# Patient Record
Sex: MALE | Race: WHITE | Employment: FULL TIME | URBAN - METROPOLITAN AREA
[De-identification: names, ages, dates, MRNs, and addresses within clinical notes are randomized per-mention and may not be internally consistent; named-entity substitution may affect disease eponyms.]

---

## 2018-08-06 ENCOUNTER — TRANSFERRED RECORDS (OUTPATIENT)
Dept: HEALTH INFORMATION MANAGEMENT | Facility: CLINIC | Age: 53
End: 2018-08-06

## 2018-08-09 ENCOUNTER — TRANSFERRED RECORDS (OUTPATIENT)
Dept: HEALTH INFORMATION MANAGEMENT | Facility: CLINIC | Age: 53
End: 2018-08-09

## 2018-08-12 ENCOUNTER — TRANSFERRED RECORDS (OUTPATIENT)
Dept: HEALTH INFORMATION MANAGEMENT | Facility: CLINIC | Age: 53
End: 2018-08-12

## 2018-08-24 ENCOUNTER — TRANSFERRED RECORDS (OUTPATIENT)
Dept: HEALTH INFORMATION MANAGEMENT | Facility: CLINIC | Age: 53
End: 2018-08-24

## 2018-12-17 ENCOUNTER — MEDICAL CORRESPONDENCE (OUTPATIENT)
Dept: HEALTH INFORMATION MANAGEMENT | Facility: CLINIC | Age: 53
End: 2018-12-17

## 2018-12-20 ENCOUNTER — TELEPHONE (OUTPATIENT)
Dept: GASTROENTEROLOGY | Facility: CLINIC | Age: 53
End: 2018-12-20

## 2018-12-20 NOTE — TELEPHONE ENCOUNTER
[facility] Intake form:    Referring/Requesting provider and Health care System: John Figueroa [facility]    Clinic contact - Name, Phone and Fax number: Judith [phone] option 0 , Fax [phone]    Requested provider (if specified): Dr. Peacock    Has patient been evaluated in clinic or had a procedure [facility] provider in the last 5 years: X No      Indication/Diagnosis for consultation: Acute Pancreatitis    Is diagnosis on list of approved diagnosis: X Yes     Has patient been evaluated by another Gastroenterologist? X Unknown     Imaging completed:     CT scan     X Yes    MRI         X No      Procedures:     Upper Endoscopy/EGD   X No     Endoscopic Ultrasound/EUS X No    ERCP      X No    Colonoscopy    X No      Are images able/being pushed to our system? X No - CT being jama    Is patient aware of request for clinc consultation and ok to be contacted to schedule? X Yes      Inform referring clinic of the following and provided fax number to: X [facility]     READ TO REFERRING CLINIC:  Due to high demands for our services our clinic requires all records including imaging studies be mailed and faxed to our facility prior to scheduling. Records should be faxed within 24-72 hours of referral if possible and images should be pushed to our PACS system or received by mail within 72 hours of referral. Our office will NOT call to follow up or request records.  Our clinic will not be able to process, schedule or contact patient without records and Images for MD review process. Once records have been reviewed and recommendation/orders have been made the patient will be contacted to schedule. If records have not been received within 2 weeks of initial referral call, referring office will be notified by letter and referral will be closed. If an appointment is unable to be offered at this time we will inform the referring office and patient via letter.

## 2018-12-24 ENCOUNTER — PATIENT OUTREACH (OUTPATIENT)
Dept: GASTROENTEROLOGY | Facility: CLINIC | Age: 53
End: 2018-12-24

## 2018-12-24 DIAGNOSIS — K85.90 PANCREATITIS: Primary | ICD-10-CM

## 2018-12-24 NOTE — PROGRESS NOTES
Advanced Endoscopy     Referring provider: John Figueroa [facility]    Clinic contact - Name, Phone and Fax number: Judith [phone] option 0 , Fax [phone]    Referred to: [facility]     Provider Requested: Ayush     Referral Received: 12/20/2018     Records received: 12/20/2018- 37 pages scanned into Epic.      Images received: Per intake CT being mailed in.     Evaluation for: Acute pancreatitis     Clinical History (per RN review):   Hospitalized at [facility] for acute pancreatitis 8/9/2018. Pt had a couple of beers and fatty food and this was labeled the cause, patient is skeptical of this as he states he does not drink excessively and not frequently.     On simvastatin for hyperlipidemia.    8/9/2018 CT   Impression;  Pancreas is somewhat poorly defined but is felt to be of normal size and without dilation of the pancreatic duct and no pancreatic calcification evident.  Apparent large phlegmon reaction surrounds the pancreas and consistent with acute pancreatitis.  Correlate with appropriate laboratory findings.  There is a small to moderate amount of free fluid in the dependent portion of the pelvis.  The appendix is normal with no abnormality involving the bowel.  The gallbladder is slightly enlarged with no biliary ductal diet dilatation       MD review date: 12/24/2018 routed to Dr. Peacock for review.   MD Decision for clinic consultation/Orders:   1.  CT   2. Clinic with Dr. Peacock    Order placed and sent to scheduling.     Referral updates/Patient contacted:   12/24/2018: message left for Zaid that referral received and once reviewed will call him back with plan.     12/27/2018: called him to let him know we are still waiting for CT scan and that referral was sent to Dr. Peacock but [place] and once this is reviewed then will call him with plan.     Clinic number left for questions/concerns.     1/3/2018: called and let Tom know plan and he  will be called with dates and times for CT and clinic. He states he can have CT scan with no issues.     Ruthy CONNELLY RN Care Coordinator  Dr. Peacock, Dr. Carrington & Dr. Mike   [facility]  [phone]

## 2018-12-26 ENCOUNTER — CARE COORDINATION (OUTPATIENT)
Dept: GASTROENTEROLOGY | Facility: CLINIC | Age: 53
End: 2018-12-26

## 2018-12-26 NOTE — PROGRESS NOTES
Received radiology report and scanned into Epic.     Received images on disc and brought down to film room to be uploaded.     CHRISTOPHER Nguyen Dr., Dr. Carrington, & Dr. Mike  [facility]  [phone]

## 2019-01-14 ENCOUNTER — ANCILLARY PROCEDURE (OUTPATIENT)
Dept: CT IMAGING | Facility: CLINIC | Age: 54
End: 2019-01-14
Payer: COMMERCIAL

## 2019-01-14 DIAGNOSIS — K85.90 PANCREATITIS: ICD-10-CM

## 2019-01-14 PROCEDURE — 74177 CT ABD & PELVIS W/CONTRAST: CPT | Performed by: RADIOLOGY

## 2019-01-14 RX ORDER — IOPAMIDOL 755 MG/ML
116 INJECTION, SOLUTION INTRAVASCULAR ONCE
Status: COMPLETED | OUTPATIENT
Start: 2019-01-14 | End: 2019-01-14

## 2019-01-14 RX ADMIN — IOPAMIDOL 116 ML: 755 INJECTION, SOLUTION INTRAVASCULAR at 12:39

## 2019-01-18 ENCOUNTER — CARE COORDINATION (OUTPATIENT)
Dept: GASTROENTEROLOGY | Facility: CLINIC | Age: 54
End: 2019-01-18

## 2019-01-18 NOTE — PROGRESS NOTES
Received images on disc and brought to film to uploaded.     Received and scanned radiology report into Epic.     CHRISTOPHER Nguyen Dr., Dr. Carrington, & Dr. Mike  [facility]  [phone]

## 2019-02-06 ENCOUNTER — DOCUMENTATION ONLY (OUTPATIENT)
Dept: CARE COORDINATION | Facility: CLINIC | Age: 54
End: 2019-02-06

## 2019-02-13 ENCOUNTER — OFFICE VISIT (OUTPATIENT)
Dept: GASTROENTEROLOGY | Facility: CLINIC | Age: 54
End: 2019-02-13
Payer: COMMERCIAL

## 2019-02-13 VITALS
SYSTOLIC BLOOD PRESSURE: 125 MMHG | TEMPERATURE: 98.4 F | WEIGHT: 198.5 LBS | BODY MASS INDEX: 28.42 KG/M2 | OXYGEN SATURATION: 97 % | HEART RATE: 66 BPM | HEIGHT: 70 IN | DIASTOLIC BLOOD PRESSURE: 87 MMHG

## 2019-02-13 DIAGNOSIS — K85.00 IDIOPATHIC ACUTE PANCREATITIS, UNSPECIFIED COMPLICATION STATUS: Primary | ICD-10-CM

## 2019-02-13 RX ORDER — SIMVASTATIN 20 MG
20 TABLET ORAL EVERY MORNING
COMMUNITY
Start: 2018-12-09

## 2019-02-13 ASSESSMENT — MIFFLIN-ST. JEOR: SCORE: 1756.64

## 2019-02-13 ASSESSMENT — PAIN SCALES - GENERAL: PAINLEVEL: NO PAIN (0)

## 2019-02-13 NOTE — PATIENT INSTRUCTIONS
1. EUS- We will call you with date and time.     2. Referral to Marlys Frey (dietician)     Follow up: after the above follow up with Dr. Peacock and Marlys Frey (dietician)     Please call with any questions or concerns regarding your clinic visit today.    It is a pleasure being involved in your health care.    Contacts post-consultation depending on your need:    Schedule Clinic Appointments          [phone] # 1   M-F 7:30 - 5 pm    Ruthy CONNELLY RN Care Coordinator         [phone]  Teresita Devi LPN            [phone]       OR Procedure Scheduling                [phone]    My Chart is available 24 hours a day and is a secure way to access your records and communicate with your care team.  I strongly recommend signing up if you haven't already done so, if you are comfortable with computers.  If you would like to inquire about this or are having problems with My Chart access, you may call [phone] or go online at [email or web address].  Please allow at least 24 hours for a response and extra time on weekends and Holidays.

## 2019-02-13 NOTE — NURSING NOTE
"Chief Complaint   Patient presents with     Consult     NEW        Vitals:    02/13/19 1003   BP: 125/87   Pulse: 66   Temp: 98.4  F (36.9  C)   TempSrc: Oral   SpO2: 97%   Weight: 90 kg (198 lb 8 oz)   Height: 1.778 m (5' 10\")       Body mass index is 28.48 kg/m .      Brijesh Olivares, EMT on 2/13/2019 at 10:08 AM                       "

## 2019-02-13 NOTE — LETTER
2/13/2019       RE: Tom Thurman  [address] [place] [address]     Dear Colleague,    Thank you for referring your patient, Tom Thurman, to the [facility] PANCRE[facility] AND BILIARY at [facility]. Please see a copy of my visit note below.    See above          Advanced Endoscopy/Pancreaticobiliary Consultation      CC/REFERRING MD: John Mosquera, by patient request   REASON FOR CONSULTATION:  Acute Pancreatitis   Assessment /Plan:   Tom Thurman is a 53-year-old man with a history of hyperlipidemia, GERD, hx of urolithiasis  questionable etoh induced etiology per OSH records , who was referred to the [facility] from an outside hospital for evauation of unexplained acute pancreatitis. .    Patient presents in consultation and follow-up of his recent history of acute pancreatitis his initial diagnosis in late July 2018 he had an elevated lipase of 4560  and on 8/9/18 he had an abd/pelvic CT which showed acute pancreatitis with peripancreatic inflammation but no necrosis of the pancreas itself. Incidentally,  multiple benign cyst in the liver with slightly enlarged gallbladder and thickening of its walls at 0.2 cm and localized calcification in gallbladder wall with no biliary ductal dilation.    A recent abd/pelvic CT was done on 1/14/19 which showed an evolving sequela of pancreatitis with resolution of previous peripancreatic inflammation into walled off necrosis.   Small internal pancreatic cyst foci also likely sequela of pancreatitis. Pancreas divisum.    Acute pancreatitis, one severe episode w early relapse. Minimal ongoing symptom: The etiology of his pancreatitis is unknown at this point, it could be multifactorial in light of his mildly elevated triglyceride (300s), ( treated with simvastatin), casual drinking, genetic predisposition, or related to PANCREAS DIVISUM, suspected on our CT.  After reviewing his CT scan, his his pancreas   intact  with small walled off necrosis around the gland that is unliekly symptomatic and does not need drainage.    We had a detailed discussion on the importance of identifying the etiology. Will start with an endoscopic ultrasound(EUS)  to rule out subtle gallbladder disease, and see if they can confirm pancreas divisum. If positive for gallbladder disease and no divisum,  will refer to surgery for cholecystectomy. More likely, If the EUS does not show gallbladder disease, and does suggest divisum, will arrange MRCP with secretin to look for structural changes and divisum..     -- EUS (to rule out gallbladder disease, hopefully confirm or ro divisum - peripancreatic collections hopefully will not interfere too much but do not want to wait months and months for collections to resolve since they have been there for 6 months.   --Dietitian referral  ---RTC with Dr. Peacock and Roxanna after the EUS    Seen and examined with NATHALIA Galvez, agree with findings and recommendations.  Total 60 minutes primarily counseling.   Suspicious for divisum, possibly gallbladder disease.   Hopefully EUS can clarify - peripancreatic collections not of clinical consequence  Will see back once done.  Advised to completely avoid ETOH in meantime  Javier Peacock MD GI Staff                 HISTORY OF PRESENT ILLNESS:      Tom Thurman is a 53-year-old man with a history of hyperlipidemia, GERD, hx of urolithiasis and acute pancreatitis- questionable etoh induced etiology, who was referred to the [facility] from an outside hospital.    Patient reports that his symptoms started late July into early August 2018 while outing with his wife he developed a sudden onset of extreme abdominal pain, nausea, vomiting-  all of the food that he had eaten, greasy type diarrhea, a 30lbs wt loss with proper diet.  Shortly after his initial hospitalization (2weeks) he developed similar symptom of abdominal pain and was readmitted treated and  discharged  At which time he was treated and discharged.  He has since had minimal to moderate abdominal pain on a regular basis but is concerned about the etiology and would like a better direction on how to have a better quality of life.    Review of System:     End of July in to the beginning of august he had a sudden onset of extreme abd pain,  vomiting - food with   Had four beer prior to the onset of the abdominal pain  No fevers or chills  + weight loss  No blurry vision, double vision or change in vision  + Abdominal pain moderate   No diarrhea   + Pain management uses ibuprofen(200mg x 3) for joint pain    No sore throat  No lymphadenopathy  No headache, paraesthesias, or weakness in a limb  No shortness of breath or wheezing  No chest pain or pressure  No arthralgias or myalgias  No rashes or skin changes  No odynophagia or dysphagia  No BRBPR, hematochezia, melena  No dysuria, frequency or urgency  No hot/cold intolerance or polyria  No anxiety or depression    PHYSICAL EXAMINATION: :   Constitutional: aaox3, cooperative, pleasant, not dyspneic/diaphoretic, no acute distress  Vitals reviewed: There were no vitals taken for this visit.  Wt:   Wt Readings from Last 2 Encounters:   No data found for Wt      Eyes: Sclera anicteric/injected  Ears/nose/mouth/throat: Normal oropharynx without ulcers or exudate, mucus membranes moist, hearing intact  Neck: supple, thyroid normal size  CV: No edema  Respiratory: Unlabored breathing  Lymph: No axillary, submandibular, supraclavicular or inguinal lymphadenopathy  Abd: Nondistended, +bs, no hepatosplenomegaly, nontender, no peritoneal signs  Skin: warm, perfused, no jaundice  Psych: Normal affect  MSK: Normal ga    Expectation of the visit: needs directions on how to live his live     Social History:   Home situation: lives with wife and kids are in college (a boy and girl)  Occupation/Schooling:  at a car dealership  Tobacco use:None  Alcohol use:  causal weekend  Couple beers or whisky  Recreational Drug use: none     I spent 50 minutes with this patient face to face and explained the conditions and plans (more than 50% of time was counseling/coordination of care, as discussed above).         Thank you for this consultation.  It was a pleasure to participate in the care of this patient; please contact us with any further questions.       Roxanna Galvez APRN, CNP, CWOCN  Division of Gastroenterology, Hepatology and Nutrition  [facility]    Pertinent Studies:       PROBLEM LIST  There are no active problems to display for this patient.      No past medical history on file.    PREVIOUS SURGERIES:  None   No past surgical history on file.    PREVIOUS ENDOSCOPY:    ALLERGIES:   Allergies not on file    PERTINENT MEDICATIONS:Medication Reviewed   No current outpatient medications on file.    SOCIAL HISTORY:  Social History     Socioeconomic History     Marital status:      Spouse name: Not on file     Number of children: Not on file     Years of education: Not on file     Highest education level: Not on file   Social Needs     Financial resource strain: Not on file     Food insecurity - worry: Not on file     Food insecurity - inability: Not on file     Transportation needs - medical: Not on file     Transportation needs - non-medical: Not on file   Occupational History     Not on file   Tobacco Use     Smoking status: Not on file   Substance and Sexual Activity     Alcohol use: Not on file     Drug use: Not on file     Sexual activity: Not on file   Other Topics Concern     Not on file   Social History Narrative     Not on file       FAMILY HISTORY:  FH of CRC: None - mother  of unknown cancer - diagnosed at 74 and  within 6 months of dx  FH of IBD: None   No family history on file.    Past/family/social history reviewed and no changes    Answers for HPI/ROS submitted by the patient on 2019   General Symptoms: No  Skin Symptoms:  No  HENT Symptoms: No  EYE SYMPTOMS: No  HEART SYMPTOMS: No  LUNG SYMPTOMS: No  INTESTINAL SYMPTOMS: No  URINARY SYMPTOMS: No  REPRODUCTIVE SYMPTOMS: No  SKELETAL SYMPTOMS: No  BLOOD SYMPTOMS: No  NERVOUS SYSTEM SYMPTOMS: No  MENTAL HEALTH SYMPTOMS: No      Again, thank you for allowing me to participate in the care of your patient.      Sincerely,    Javier Peacock MD

## 2019-02-14 ENCOUNTER — CARE COORDINATION (OUTPATIENT)
Dept: GASTROENTEROLOGY | Facility: CLINIC | Age: 54
End: 2019-02-14

## 2019-02-14 DIAGNOSIS — K85.90 ACUTE PANCREATITIS: Primary | ICD-10-CM

## 2019-03-04 ENCOUNTER — TRANSFERRED RECORDS (OUTPATIENT)
Dept: HEALTH INFORMATION MANAGEMENT | Facility: CLINIC | Age: 54
End: 2019-03-04

## 2019-03-05 ENCOUNTER — TRANSFERRED RECORDS (OUTPATIENT)
Dept: HEALTH INFORMATION MANAGEMENT | Facility: CLINIC | Age: 54
End: 2019-03-05

## 2019-03-05 ENCOUNTER — TELEPHONE (OUTPATIENT)
Dept: GASTROENTEROLOGY | Facility: CLINIC | Age: 54
End: 2019-03-05

## 2019-03-05 NOTE — TELEPHONE ENCOUNTER
: [x] N/A   [] Yes:  Language /  ID:       Patient scheduled for:  [] EGD  [] Colonoscopy  [x] EUS  [] Flex Sig   [] Other:     Indication for procedure. [] Screening   [x] Acute pancreatitis (MAC)    Procedure Provider:  Rashid      Referring Provider. Ayush    Arrival time verified: Tues; 3/12/19; 0900    Facility location verified:   [x][facility] - [address] [place] [address], 1st Floor, [address]  [address] [facility], 5th floor       Prep Type:   []Golytely eRx: ;  [] MoviPrep: , [] MiraLax: , [] Other:   [x]NPO /p MN, No solid food /p 2200 the night before    Anticoagulants or blood thinners: [x]None [] ASA 81mg [] Warfarin  [] Warfarin + Lovenox bridge [] Plavix [] Effient [] Eliquis  [] Xarelto  [] Brilinta [x] NSAIDS  [] Other    LAST anticoagulant dose: Date/Time: 3/4/19     INR:  N/A    Electronic implanted devices: [x] No  [] IPG  []  ICD  []  LVAD  []     H&P / Pre op physical completed: [] N/A, [x] Complete, Date 3/4/19 - PCP (Adolph [place]), 12-Lead ECG , [] Scheduled, Date , [] No,     Additional Information:     _______________________________________________      Instructions given: [] Rec d & Read   [x] Reviewed   [] Resent via "[employer]      [] Resent via eMail (see below)     Pre procedure teaching completed: [x] Yes - Reviewed, [] No,     [x] No questions regarding Sedation as ordered, []     Transportation from procedure: [x] Yes Wife, [] Pending , [] No     Joann Guadarrama RN  [facility]/ealth Endoscopy

## 2019-03-11 ENCOUNTER — TELEPHONE (OUTPATIENT)
Dept: GASTROENTEROLOGY | Facility: CLINIC | Age: 54
End: 2019-03-11

## 2019-03-11 ENCOUNTER — CARE COORDINATION (OUTPATIENT)
Dept: GASTROENTEROLOGY | Facility: CLINIC | Age: 54
End: 2019-03-11

## 2019-03-11 NOTE — PROGRESS NOTES
Pt called scheduling area with questions regarding change in provider for procedure tomorrow. In clinic Dr. Peacock said that Dr. Mike would be great for this procedure. With his paternity leave, pt wonding if Dr Keenan is qualified in the same way. I reassured the pt that Dr Keenan works closely with Dr. Peacock and he would endorse him also. Pt agreed to procedure tomorrow w/ Dr. Keenan.    Lorie Love, RN Care Coordinator

## 2019-03-11 NOTE — TELEPHONE ENCOUNTER
Left voicemail with patient notifying him of physician change for EUS on 3/12/2019.  Dr. Keenan will be doing procedure due to Dr. Keller going out on family leave.

## 2019-04-04 ENCOUNTER — TRANSFERRED RECORDS (OUTPATIENT)
Dept: HEALTH INFORMATION MANAGEMENT | Facility: CLINIC | Age: 54
End: 2019-04-04

## 2019-04-15 ENCOUNTER — TELEPHONE (OUTPATIENT)
Dept: GASTROENTEROLOGY | Facility: CLINIC | Age: 54
End: 2019-04-15

## 2019-04-15 NOTE — TELEPHONE ENCOUNTER
Patient scheduled for:  [] EGD  [] Colonoscopy  [x] EUS  [] Flex Sig   [] Other:     Indication for procedure. [] Screening   [x] Acute pancreatitis    Procedure Provider:  Rashid                  Referring Provider. Ayush    Arrival time verified: Tues; 4/16/19; 0930 (updated time)    Facility location verified:   [x][address], 1st Floor, [address]  [address] [facility], 5th floor   __________________________________________    Instructions given: [x] Rec d & Read   [] Reviewed   [] Resent via [email or web address]     [] Resent via eMail (see below)     Pre procedure teaching completed: [x] Yes - Reviewed, [] No,     [x] No questions regarding Sedation as ordered, []     Transportation from procedure & responsible adult to be with patient following procedure for a minimum of 6 hrs (Conscious Sedation) 24 hrs (MAC): [x] Yes Wife, [] Pending , [] No     Joann Guadarrama, RN  [facility]/ealth Endoscopy

## 2019-04-15 NOTE — TELEPHONE ENCOUNTER
VM with request pt contact Endoscopy Pre-assessment RN to review upcoming procedure information.  Telephone call-back number provided.    Joann Guadarrama, RN  [facility] Endoscopy    Additional Information regarding appointment:  Rescheduled:     Patient scheduled for:  [] EGD  [] Colonoscopy  [x] EUS  [] Flex Sig   [] Other:     Indication for procedure. [] Screening   [x] Acute pancreatitis    Procedure Provider:  Rashid      Referring Provider. Ayush    Arrival time verified: Tues; 4/16/19; 0800    Facility location verified:   [x][address], 1st Floor, [address]  [address] [facility], 5th floor

## 2019-04-16 ENCOUNTER — HOSPITAL ENCOUNTER (OUTPATIENT)
Facility: CLINIC | Age: 54
Discharge: HOME | End: 2019-04-16
Attending: INTERNAL MEDICINE | Admitting: INTERNAL MEDICINE
Payer: COMMERCIAL

## 2019-04-16 ENCOUNTER — ANESTHESIA (OUTPATIENT)
Dept: GASTROENTEROLOGY | Facility: CLINIC | Age: 54
End: 2019-04-16
Payer: COMMERCIAL

## 2019-04-16 ENCOUNTER — ANESTHESIA EVENT (OUTPATIENT)
Dept: GASTROENTEROLOGY | Facility: CLINIC | Age: 54
End: 2019-04-16
Payer: COMMERCIAL

## 2019-04-16 VITALS
BODY MASS INDEX: 28.19 KG/M2 | DIASTOLIC BLOOD PRESSURE: 73 MMHG | RESPIRATION RATE: 15 BRPM | TEMPERATURE: 97.8 F | HEIGHT: 70 IN | HEART RATE: 45 BPM | SYSTOLIC BLOOD PRESSURE: 118 MMHG | OXYGEN SATURATION: 94 % | WEIGHT: 196.9 LBS

## 2019-04-16 PROCEDURE — 25800030 ZZH RX IP 258 OP 636: Performed by: NURSE ANESTHETIST, CERTIFIED REGISTERED

## 2019-04-16 PROCEDURE — 37000008 ZZH ANESTHESIA TECHNICAL FEE, 1ST 30 MIN: Performed by: INTERNAL MEDICINE

## 2019-04-16 PROCEDURE — 25000125 ZZHC RX 250: Performed by: NURSE ANESTHETIST, CERTIFIED REGISTERED

## 2019-04-16 PROCEDURE — 37000009 ZZH ANESTHESIA TECHNICAL FEE, EACH ADDTL 15 MIN: Performed by: INTERNAL MEDICINE

## 2019-04-16 PROCEDURE — 25000128 H RX IP 250 OP 636: Performed by: NURSE ANESTHETIST, CERTIFIED REGISTERED

## 2019-04-16 PROCEDURE — 43259 EGD US EXAM DUODENUM/JEJUNUM: CPT | Performed by: INTERNAL MEDICINE

## 2019-04-16 RX ORDER — SODIUM CHLORIDE, SODIUM LACTATE, POTASSIUM CHLORIDE, CALCIUM CHLORIDE 600; 310; 30; 20 MG/100ML; MG/100ML; MG/100ML; MG/100ML
INJECTION, SOLUTION INTRAVENOUS CONTINUOUS PRN
Status: DISCONTINUED | OUTPATIENT
Start: 2019-04-16 | End: 2019-04-16

## 2019-04-16 RX ORDER — ONDANSETRON 2 MG/ML
INJECTION INTRAMUSCULAR; INTRAVENOUS PRN
Status: DISCONTINUED | OUTPATIENT
Start: 2019-04-16 | End: 2019-04-16

## 2019-04-16 RX ORDER — PROPOFOL 10 MG/ML
INJECTION, EMULSION INTRAVENOUS PRN
Status: DISCONTINUED | OUTPATIENT
Start: 2019-04-16 | End: 2019-04-16

## 2019-04-16 RX ORDER — PROPOFOL 10 MG/ML
INJECTION, EMULSION INTRAVENOUS CONTINUOUS PRN
Status: DISCONTINUED | OUTPATIENT
Start: 2019-04-16 | End: 2019-04-16

## 2019-04-16 RX ORDER — FENTANYL CITRATE 50 UG/ML
INJECTION, SOLUTION INTRAMUSCULAR; INTRAVENOUS PRN
Status: DISCONTINUED | OUTPATIENT
Start: 2019-04-16 | End: 2019-04-16

## 2019-04-16 RX ORDER — LIDOCAINE HYDROCHLORIDE 40 MG/ML
INJECTION, SOLUTION RETROBULBAR PRN
Status: DISCONTINUED | OUTPATIENT
Start: 2019-04-16 | End: 2019-04-16

## 2019-04-16 RX ORDER — GLYCOPYRROLATE 0.2 MG/ML
INJECTION, SOLUTION INTRAMUSCULAR; INTRAVENOUS PRN
Status: DISCONTINUED | OUTPATIENT
Start: 2019-04-16 | End: 2019-04-16

## 2019-04-16 RX ORDER — LIDOCAINE 40 MG/G
CREAM TOPICAL
Status: DISCONTINUED | OUTPATIENT
Start: 2019-04-16 | End: 2019-04-16 | Stop reason: HOSPADM

## 2019-04-16 RX ADMIN — GLYCOPYRROLATE 0.2 MG: 0.2 INJECTION, SOLUTION INTRAMUSCULAR; INTRAVENOUS at 11:13

## 2019-04-16 RX ADMIN — LIDOCAINE HYDROCHLORIDE 4 ML: 40 INJECTION, SOLUTION RETROBULBAR; TOPICAL at 11:02

## 2019-04-16 RX ADMIN — PROPOFOL 30 MG: 10 INJECTION, EMULSION INTRAVENOUS at 11:07

## 2019-04-16 RX ADMIN — FENTANYL CITRATE 50 MCG: 50 INJECTION, SOLUTION INTRAMUSCULAR; INTRAVENOUS at 11:06

## 2019-04-16 RX ADMIN — SODIUM CHLORIDE, POTASSIUM CHLORIDE, SODIUM LACTATE AND CALCIUM CHLORIDE: 600; 310; 30; 20 INJECTION, SOLUTION INTRAVENOUS at 10:55

## 2019-04-16 RX ADMIN — PROPOFOL 100 MCG/KG/MIN: 10 INJECTION, EMULSION INTRAVENOUS at 11:03

## 2019-04-16 RX ADMIN — ONDANSETRON 4 MG: 2 INJECTION INTRAMUSCULAR; INTRAVENOUS at 11:13

## 2019-04-16 RX ADMIN — FENTANYL CITRATE 50 MCG: 50 INJECTION, SOLUTION INTRAMUSCULAR; INTRAVENOUS at 11:00

## 2019-04-16 RX ADMIN — MIDAZOLAM 2 MG: 1 INJECTION INTRAMUSCULAR; INTRAVENOUS at 10:55

## 2019-04-16 RX ADMIN — PROPOFOL 20 MG: 10 INJECTION, EMULSION INTRAVENOUS at 11:18

## 2019-04-16 ASSESSMENT — MIFFLIN-ST. JEOR: SCORE: 1749.38

## 2019-04-16 NOTE — ANESTHESIA PREPROCEDURE EVALUATION
"Anesthesia Pre-Procedure Evaluation    Patient: Tom Thurman   MRN:     [id number] Gender:   male   Age:    53 year old :      1965        Preoperative Diagnosis: Acute pancreatitis [K85.90]; EUS w/MAC- prep packet mailed   Procedure(s):  COMBINED ENDOSCOPIC ULTRASOUND, ESOPHAGOSCOPY, GASTROSCOPY, DUODENOSCOPY (EGD)     No past medical history on file.   No past surgical history on file.            NIKKY ROJO AN PHYSICAL EXAM    No results found for: WBC, HGB, HCT, PLT, CRP, SED, NA, POTASSIUM, CHLORIDE, CO2, BUN, CR, GLC, ASHLEY, PHOS, MAG, ALBUMIN, PROTTOTAL, ALT, AST, GGT, ALKPHOS, BILITOTAL, BILIDIRECT, LIPASE, AMYLASE, JAVI, PTT, INR, FIBR, TSH, T4, T3, HCG, HCGS, CKTOTAL, CKMB, TROPN    Preop Vitals  BP Readings from Last 3 Encounters:   19 125/87    Pulse Readings from Last 3 Encounters:   19 66      Resp Readings from Last 3 Encounters:   No data found for Resp    SpO2 Readings from Last 3 Encounters:   19 97%      Temp Readings from Last 1 Encounters:   19 36.9  C (98.4  F) (Oral)    Ht Readings from Last 1 Encounters:   19 1.778 m (5' 10\")      Wt Readings from Last 1 Encounters:   19 90 kg (198 lb 8 oz)    Estimated body mass index is 28.48 kg/m  as calculated from the following:    Height as of 19: 1.778 m (5' 10\").    Weight as of 19: 90 kg (198 lb 8 oz).     LDA:            Assessment:   ASA SCORE: 3    NPO Status: > 6 hours since completed Solid Foods   Documentation: H&P complete; Preop Testing complete; Consents complete   Proceeding: Proceed without further delay  Tobacco Use:  NO Active use of Tobacco/UNKNOWN Tobacco use status     Plan:   Anes. Type:  MAC   Pre-Induction: Midazolam IV   Induction:  IV (Standard)   Airway: Native Airway   Access/Monitoring: PIV   Maintenance: Propofol; IV   Emergence: Procedure Site   Logistics: Same Day Surgery     Postop Pain/Sedation Strategy:  Standard-Options: Opioids PRN     PONV Management:  Adult Risk " Factors:, Non-Smoker, Postop Opioids  Prevention: Propofol Infusion     CONSENT: Direct conversation   Plan and risks discussed with: Patient   Blood Products: Consent Deferred (Minimal Blood Loss)               Patient seen by me, agree with the above.  Aroldo Moseley MD  April 16, 2019           Aroldo Moseley MD

## 2019-04-16 NOTE — DISCHARGE INSTRUCTIONS
[facility] Endoscopic Ultrasound with Monitored Anesthesia Care  For 24 hours after your procedure  Sedation:  1. Get plenty of rest. A responsible adult must stay with you for at least 24 hours after you leave the hospital.   2. Do not drive or use heavy equipment. If you have weakness or tingling, don't drive or use heavy equipment until this feeling goes away.  3. Do not drink alcohol.  4. Avoid strenuous or risky activities. Ask for help when climbing stairs.   5. You may feel lightheaded. IF so, sit for a few minutes before standing. Have someone help you get up.   6. If you have nausea (feel sick to your stomach): Drink only clear liquids such as apple juice, ginger ale, broth or 7-Up. Rest may also help. Be sure to drink enough fluids. Move to a regular diet as you feel able.  7. You may have a slight fever. Call the doctor if your fever is over 100 F (37.7 C) (taken under the tongue) or lasts longer than 24 hours.  8. You may have a dry mouth, a sore throat, muscle aches or trouble sleeping. These should go away after 24 hours.  9. Do not make important or legal decisions.   Procedural:  1. Wait one hour before eating or drinking. Start with sips of water. When your gag reflex has returned, you may return to your normal diet, medicines, and light exercise.  2. Some bloating is normal. You may have large burps or pass air.  3. You may have a sore throat for 2 to 3 days. If so, it may help to:    Avoid hot liquids for 24 hours.    Use sore throat lozenges.    Gargle as need with salt water up to 4 times a day. Mix 1 cup of warm water with 1 teaspoon of salt. Do not swallow.  4. You may take Tylenol (acetaminophen) for pain unless your doctor has told you not to.   Do not take aspirin or ibuprofen (Advil, Motrin, or other anti-inflammatory  drugs) for __3___ days.  Call right away if you have:  1. Unusual pain in belly or chest pain not relieved with passing air.  2. Severe throat pain or trouble  swallowing.  3. Black stools (tar-like looking bowel movement).  4. Signs of infection (fever}  5. It has been over 8 to 10 hours since surgery and you are still not able to urinate (pass water).  6. Headache for over 24 hours.  7. .  Follow-up:  ___  If you have severe pain, bleeding, vomit blood, or shortness of breath, go to an emergency room.  If you have questions, call:  Endoscopy: Monday to Friday, 7 a.m. to 4:30 p.m. [phone] (We may have to call you back)  After hours: Hospital  [phone] (Ask for the GI fellow on call)

## 2019-04-16 NOTE — ANESTHESIA POSTPROCEDURE EVALUATION
Anesthesia POST Procedure Evaluation    Patient: Tom Thurman   MRN:     [id number] Gender:   male   Age:    53 year old :      1965        Preoperative Diagnosis: Acute pancreatitis [K85.90]; EUS w/MAC- prep packet mailed   Procedure(s):  COMBINED ENDOSCOPIC ULTRASOUND, ESOPHAGOSCOPY, GASTROSCOPY, DUODENOSCOPY (EGD)   Postop Comments: No value filed.       Anesthesia Type:  MAC    Reportable Event: NO     PAIN: Uncomplicated   Sign Out status: Comfortable, Well controlled pain     PONV: No PONV   Sign Out status:  No Nausea or Vomiting     Neuro/Psych: Uneventful perioperative course   Sign Out Status: Preoperative baseline; Age appropriate mentation     Airway/Resp.: Uneventful perioperative course   Sign Out Status: Non labored breathing, age appropriate RR; Resp. Status within EXPECTED Parameters     CV: Uneventful perioperative course   Sign Out status: Appropriate BP and perfusion indices; Appropriate HR/Rhythm     Disposition:   Sign Out in:  PACU  Disposition:  Phase II; Home  Recovery Course: Uneventful  Follow-Up: Not required           Last Anesthesia Record Vitals:  CRNA VITALS  2019 1056 - 2019 1156      2019             Pulse:  75    Ht Rate:  73    SpO2:  95 %          Last PACU Vitals:  Vitals Value Taken Time   /70 2019 11:29 AM   Temp     Pulse 68 2019 11:29 AM   Resp 15 2019 11:29 AM   SpO2 92 % 2019 11:29 AM   Temp src     NIBP 111/78 2019 11:18 AM   Pulse 75 2019 11:22 AM   SpO2 95 % 2019 11:22 AM   Resp     Temp     Ht Rate 73 2019 11:22 AM   Temp 2           Electronically Signed By: Aroldo Moseley MD, 2019, 1:06 PM

## 2019-04-19 LAB — UPPER EUS: NORMAL

## 2019-04-23 ENCOUNTER — CARE COORDINATION (OUTPATIENT)
Dept: GASTROENTEROLOGY | Facility: CLINIC | Age: 54
End: 2019-04-23

## 2019-04-23 DIAGNOSIS — K86.1 IDIOPATHIC CHRONIC PANCREATITIS (H): Primary | ICD-10-CM

## 2019-04-23 NOTE — PROGRESS NOTES
"Post op call:  \"sensation\" in pancreas area, left center abdomen, sharp pain, below rib cage. Getting better. Tolerating bland diet. No fever, nausea, vomiting.     Follow up per procedure note:  Will recommend panc-bili follow up visit with Dr Peacock in 6-8 weeks with a CT scan of abdomen with IV contrast scheduled before clinic visit to re-evaluate for residual pancreatic necrosis. If necrosis has resolved, general surgery consultation can be arranged for cholecystectomy to prevent further attacks of pancreatitis.     Reviewed information and timing with Zaid. Emailed copy of procedure note. Orders placed for CT per orders. Scheduled clinic follow up with Ayush on 6/12    Lorie Love RN Care Coordinator    "

## 2019-06-12 ENCOUNTER — OFFICE VISIT (OUTPATIENT)
Dept: SURGERY | Facility: CLINIC | Age: 54
End: 2019-06-12
Attending: SURGERY
Payer: COMMERCIAL

## 2019-06-12 ENCOUNTER — OFFICE VISIT (OUTPATIENT)
Dept: SURGERY | Facility: CLINIC | Age: 54
End: 2019-06-12
Payer: COMMERCIAL

## 2019-06-12 ENCOUNTER — PRE VISIT (OUTPATIENT)
Dept: SURGERY | Facility: CLINIC | Age: 54
End: 2019-06-12

## 2019-06-12 ENCOUNTER — OFFICE VISIT (OUTPATIENT)
Dept: GASTROENTEROLOGY | Facility: CLINIC | Age: 54
End: 2019-06-12
Payer: COMMERCIAL

## 2019-06-12 ENCOUNTER — ANESTHESIA EVENT (OUTPATIENT)
Dept: SURGERY | Facility: CLINIC | Age: 54
End: 2019-06-12
Payer: COMMERCIAL

## 2019-06-12 ENCOUNTER — ANCILLARY PROCEDURE (OUTPATIENT)
Dept: CT IMAGING | Facility: CLINIC | Age: 54
End: 2019-06-12
Payer: COMMERCIAL

## 2019-06-12 VITALS
DIASTOLIC BLOOD PRESSURE: 64 MMHG | WEIGHT: 203 LBS | HEART RATE: 56 BPM | HEIGHT: 70 IN | BODY MASS INDEX: 29.06 KG/M2 | OXYGEN SATURATION: 94 % | SYSTOLIC BLOOD PRESSURE: 132 MMHG

## 2019-06-12 VITALS
OXYGEN SATURATION: 94 % | SYSTOLIC BLOOD PRESSURE: 132 MMHG | DIASTOLIC BLOOD PRESSURE: 64 MMHG | TEMPERATURE: 98.6 F | HEIGHT: 70 IN | HEART RATE: 56 BPM | RESPIRATION RATE: 16 BRPM | WEIGHT: 203 LBS | BODY MASS INDEX: 29.06 KG/M2

## 2019-06-12 DIAGNOSIS — K86.1 CHRONIC PANCREATITIS, UNSPECIFIED PANCREATITIS TYPE (H): ICD-10-CM

## 2019-06-12 DIAGNOSIS — K85.10 GALLSTONE PANCREATITIS: Primary | ICD-10-CM

## 2019-06-12 DIAGNOSIS — K86.89 PANCREATIC NECROSIS: Primary | ICD-10-CM

## 2019-06-12 DIAGNOSIS — K86.1 IDIOPATHIC CHRONIC PANCREATITIS (H): ICD-10-CM

## 2019-06-12 DIAGNOSIS — Z01.818 PREOP EXAMINATION: Primary | ICD-10-CM

## 2019-06-12 DIAGNOSIS — Z01.818 PREOP EXAMINATION: ICD-10-CM

## 2019-06-12 LAB
ALBUMIN SERPL-MCNC: 3.9 G/DL (ref 3.4–5)
ALP SERPL-CCNC: 90 U/L (ref 40–150)
ALT SERPL W P-5'-P-CCNC: 36 U/L (ref 0–70)
ANION GAP SERPL CALCULATED.3IONS-SCNC: 6 MMOL/L (ref 3–14)
AST SERPL W P-5'-P-CCNC: 23 U/L (ref 0–45)
BILIRUB SERPL-MCNC: 0.3 MG/DL (ref 0.2–1.3)
BUN SERPL-MCNC: 12 MG/DL (ref 7–30)
CALCIUM SERPL-MCNC: 8.4 MG/DL (ref 8.5–10.1)
CHLORIDE SERPL-SCNC: 104 MMOL/L (ref 94–109)
CO2 SERPL-SCNC: 28 MMOL/L (ref 20–32)
CREAT SERPL-MCNC: 0.9 MG/DL (ref 0.66–1.25)
ERYTHROCYTE [DISTWIDTH] IN BLOOD BY AUTOMATED COUNT: 12.8 % (ref 10–15)
GFR SERPL CREATININE-BSD FRML MDRD: >90 ML/MIN/{1.73_M2}
GLUCOSE SERPL-MCNC: 89 MG/DL (ref 70–99)
HCT VFR BLD AUTO: 44 % (ref 40–53)
HGB BLD-MCNC: 14.8 G/DL (ref 13.3–17.7)
MCH RBC QN AUTO: 31 PG (ref 26.5–33)
MCHC RBC AUTO-ENTMCNC: 33.6 G/DL (ref 31.5–36.5)
MCV RBC AUTO: 92 FL (ref 78–100)
PLATELET # BLD AUTO: 202 10E9/L (ref 150–450)
POTASSIUM SERPL-SCNC: 3.7 MMOL/L (ref 3.4–5.3)
PROT SERPL-MCNC: 7.2 G/DL (ref 6.8–8.8)
RBC # BLD AUTO: 4.77 10E12/L (ref 4.4–5.9)
SODIUM SERPL-SCNC: 138 MMOL/L (ref 133–144)
WBC # BLD AUTO: 7.6 10E9/L (ref 4–11)

## 2019-06-12 RX ORDER — IOPAMIDOL 755 MG/ML
120 INJECTION, SOLUTION INTRAVASCULAR ONCE
Status: COMPLETED | OUTPATIENT
Start: 2019-06-12 | End: 2019-06-12

## 2019-06-12 RX ADMIN — IOPAMIDOL 120 ML: 755 INJECTION, SOLUTION INTRAVASCULAR at 08:34

## 2019-06-12 ASSESSMENT — PAIN SCALES - GENERAL
PAINLEVEL: NO PAIN (0)
PAINLEVEL: NO PAIN (0)

## 2019-06-12 ASSESSMENT — MIFFLIN-ST. JEOR
SCORE: 1777.05
SCORE: 1777.05

## 2019-06-12 NOTE — PROGRESS NOTES
New General Surgery Consultation Note        Tom Thurman  [id number]  1965 June 12, 2019     Requesting Provider: Referred Self     Dear Dr Peacock     I had the pleasure of seeing your patient, Tom Thurman.  He is a 53 year old male who is being seen in consultation for the following concern(s).     CHIEF COMPLAINT:  Gallstone pancreatitis    HISTORY OF PRESENT ILLNESS:  Mr Thurman is a 53M who has had a couple recent episodes of pancreatitis. This has been attributed to either pancreas divisum or gallstone pancreatitis, as he has evidence of gallstone on EUS. He has stable and improving inflammation, and is doing well clinical;ly. He has regained the 30 lbs he had lost. He was referred for cholecystectomy by Dr. Peacock.      ROS  10 pt ROS negative    PAST MEDICAL HISTORY:  No past medical history on file.      PAST SURGICAL HISTORY:  Past Surgical History:   Procedure Laterality Date     ESOPHAGOSCOPY, GASTROSCOPY, DUODENOSCOPY (EGD), COMBINED N/A 4/16/2019    Procedure: COMBINED ENDOSCOPIC ULTRASOUND, ESOPHAGOSCOPY, GASTROSCOPY, DUODENOSCOPY (EGD);  Surgeon: Guru Ishmael Mike MD;  Location:  GI        MEDICATIONS:  Current Outpatient Medications   Medication     simvastatin (ZOCOR) 20 MG tablet     No current facility-administered medications for this visit.         ALLERGIES:  No Known Allergies     SOCIAL HISTORY:  Social History     Socioeconomic History     Marital status:      Spouse name: Not on file     Number of children: Not on file     Years of education: Not on file     Highest education level: Not on file   Occupational History     Not on file   Social Needs     Financial resource strain: Not on file     Food insecurity:     Worry: Not on file     Inability: Not on file     Transportation needs:     Medical: Not on file     Non-medical: Not on file   Tobacco Use     Smoking status: Never Smoker     Smokeless tobacco: Never Used   Substance and Sexual  Activity     Alcohol use: Yes     Comment: casual     Drug use: Never     Sexual activity: Not on file   Lifestyle     Physical activity:     Days per week: Not on file     Minutes per session: Not on file     Stress: Not on file   Relationships     Social connections:     Talks on phone: Not on file     Gets together: Not on file     Attends Latter day service: Not on file     Active member of club or organization: Not on file     Attends meetings of clubs or organizations: Not on file     Relationship status: Not on file     Intimate partner violence:     Fear of current or ex partner: Not on file     Emotionally abused: Not on file     Physically abused: Not on file     Forced sexual activity: Not on file   Other Topics Concern     Parent/sibling w/ CABG, MI or angioplasty before 65F 55M? Not Asked   Social History Narrative     Not on file       FAMILY HISTORY:  No family history on file.     PHYSICAL EXAM:  Vital Signs: There were no vitals taken for this visit.  HEENT: NCAT; MMM;   Lungs: Breathing unlabored  Abdomen: soft, nontender, without hepatosplenomegaly or masses     PHYSICAL EXAM AREA OF INTEREST:  S/nt/nd, no [place] sign     PERTINENT IMAGING/TESTING:  CT reviewed      ASSESSMENT:   Tom Thurman is a 53 year old male with resolving pancreatitis, potentially of biliary etiology.      DISCUSSION OF RISKS:  I reviewed the risks of surgery with Tom Thurman.    These include, but are not limited to, death, myocardial infarction, pneumonia, urinary tract infection, deep venous thrombosis with or without pulmonary embolus, abdominal infection from bowel injury or abscess, bowel obstruction, wound infection, and bleeding.    More specific risks related to laparoscopic cholecystectomy include bile duct injury (3/1000), bile leak (10/1000), retained common bile duct stone (10/1000), postcholecystectomy diarrhea (1-2%) and these complications may require additional treatment.    PLAN:   Plan for outpt lap  cliff, PAC/Periop.   [place] due to potential for excessive inflammation.   Sincerely,     John Scott MD

## 2019-06-12 NOTE — LETTER
"6/12/2019       RE: Tom Thurman  [address]   [place] [address]     Dear Colleague,    Thank you for referring your patient, Tom Thurman, to the [facility] PANCRE[facility]ILI[facility] at [facility]. Please see a copy of my visit note below.    CC: pancreatitis follow-up    Subjective:     Patient presenting for follow-up for 2 episodes of acute pancreatitis is 8/2018.  These resolved with conservative management and he had a f/u EUS 1/2019 that showed cholelithiasis.  Repeat CT today as per our read showed (official pending) that the majority of the fluid collection has significantly decreased with stable appearing hepatic cysts.      He denies any interval episodes of pancreatitis, no fevers or chills and he has been able to have anormal diet and has gained the weight he lost with his pancreatitis episodes.  He has not had any changes to his stools, all normal 1x/day without black or white appearance.  He has not had anjaundice or eye yellowing noted by himself or family.    He does report minimal alcohol intake with no more than 1 drink on rare occassions with friends.    Lives in [place], independent with family.  Less than 7 drinks per week.    Past medical history:  -Acute pancreatitis with pancreatic necrosis  -Cholelithiasis    Family history:  No known    Allergies: NKDA    Current meds  Simvastatin 20 mg daily    /64 (BP Location: Left arm, Patient Position: Chair, Cuff Size: Adult Regular)   Pulse 56   Ht 1.778 m (5' 10\")   Wt 92.1 kg (203 lb)   SpO2 94%   BMI 29.13 kg/m        EXAM:  Constitutional: healthy, alert and no distress   Abdomen: Abdomen soft, non-tender. BS normal. No masses, organomegaly  CV: purposeful weight gaion, warm extremities, normal s1/2+ no MGR  Respiratory CTAB    A/P  Patient with h/o recurrent pancreatitis with no obvious family history who appears to be doing well.  Given CTand recent EUS findings first " etiology on differential is biliary stone disease.  Given divisum seen on EUS this is sligtly less likely as a biliary stone should not be able to completely occlude the pancreatic duct to an extent that could cause distal pancreatitis.  Other etiologies include the divisum itself, although it is controversial whether this could cause this extent of pancreatitis as well.  His EtOH intae should not predispose him to pancreatitis, Triglycerides are grossly normal and he is on a statin.  Next step will be a planned laparoscopic cholecystectomy and monitoring.  He is requesting this be done this month for insurance issues.  Plan:  -gen surgery consult for cholecystectomy  -no labs  -f/u if symptoms recur    Eliseo Pena   Resident Physician, PGY-2        Patient's care was discussed with Dr. Peacock    Seen and examined with GI fellow, agree with findings and recommendations.  25 minutes more than 50% counseling. Although appears to have pancreas divisum by EUS, and had body/tail pancreatitis, in the presence of documented gallstones, recommend laparoscopic cholecystectomy. Discussed w Dr Scott of [facility] surgery who has agreed to see him today.  Also discussed that if he has another episode of AP after cholecystectomy, which may well occur, then we discuss the ongoing randomized trial of minor papillotomy for pancreas divisum.  Javier Peacock MD GI Staff    Again, thank you for allowing me to participate in the care of your patient.      Sincerely,    Javier Peacock MD

## 2019-06-12 NOTE — LETTER
6/12/2019       RE: Tom Thurman  [address] [place] [address]     Dear Colleague,    Thank you for referring your patient, Tom Thurman, to the [facility] SURGICAL WEIGHT [facility]NAGEMENT at [facility]. Please see a copy of my visit note below.        New General Surgery Consultation Note        Tom Thurman  [id number]  1965 June 12, 2019     Requesting Provider: Referred Self     Dear Dr Peacock     I had the pleasure of seeing your patient, Tom Thurman.  He is a 53 year old male who is being seen in consultation for the following concern(s).     CHIEF COMPLAINT:  Gallstone pancreatitis    HISTORY OF PRESENT ILLNESS:  Mr Thurman is a 53M who has had a couple recent episodes of pancreatitis. This has been attributed to either pancreas divisum or gallstone pancreatitis, as he has evidence of gallstone on EUS. He has stable and improving inflammation, and is doing well clinical;ly. He has regained the 30 lbs he had lost. He was referred for cholecystectomy by Dr. Peacock.      ROS  10 pt ROS negative    PAST MEDICAL HISTORY:  No past medical history on file.      PAST SURGICAL HISTORY:  Past Surgical History:   Procedure Laterality Date     ESOPHAGOSCOPY, GASTROSCOPY, DUODENOSCOPY (EGD), COMBINED N/A 4/16/2019    Procedure: COMBINED ENDOSCOPIC ULTRASOUND, ESOPHAGOSCOPY, GASTROSCOPY, DUODENOSCOPY (EGD);  Surgeon: Guru Ishmael Mike MD;  Location:  GI        MEDICATIONS:  Current Outpatient Medications   Medication     simvastatin (ZOCOR) 20 MG tablet     No current facility-administered medications for this visit.         ALLERGIES:  No Known Allergies     SOCIAL HISTORY:  Social History     Socioeconomic History     Marital status:      Spouse name: Not on file     Number of children: Not on file     Years of education: Not on file     Highest education level: Not on file   Occupational History     Not on file   Social  Needs     Financial resource strain: Not on file     Food insecurity:     Worry: Not on file     Inability: Not on file     Transportation needs:     Medical: Not on file     Non-medical: Not on file   Tobacco Use     Smoking status: Never Smoker     Smokeless tobacco: Never Used   Substance and Sexual Activity     Alcohol use: Yes     Comment: casual     Drug use: Never     Sexual activity: Not on file   Lifestyle     Physical activity:     Days per week: Not on file     Minutes per session: Not on file     Stress: Not on file   Relationships     Social connections:     Talks on phone: Not on file     Gets together: Not on file     Attends Nondenominational service: Not on file     Active member of club or organization: Not on file     Attends meetings of clubs or organizations: Not on file     Relationship status: Not on file     Intimate partner violence:     Fear of current or ex partner: Not on file     Emotionally abused: Not on file     Physically abused: Not on file     Forced sexual activity: Not on file   Other Topics Concern     Parent/sibling w/ CABG, MI or angioplasty before 65F 55M? Not Asked   Social History Narrative     Not on file       FAMILY HISTORY:  No family history on file.     PHYSICAL EXAM:  Vital Signs: There were no vitals taken for this visit.  HEENT: NCAT; MMM;   Lungs: Breathing unlabored  Abdomen: soft, nontender, without hepatosplenomegaly or masses     PHYSICAL EXAM AREA OF INTEREST:  S/nt/nd, no [place] sign     PERTINENT IMAGING/TESTING:  CT reviewed      ASSESSMENT:   Tom Thurman is a 53 year old male with resolving pancreatitis, potentially of biliary etiology.      DISCUSSION OF RISKS:  I reviewed the risks of surgery with Tom Thurman.    These include, but are not limited to, death, myocardial infarction, pneumonia, urinary tract infection, deep venous thrombosis with or without pulmonary embolus, abdominal infection from bowel injury or abscess, bowel obstruction, wound  infection, and bleeding.    More specific risks related to laparoscopic cholecystectomy include bile duct injury (3/1000), bile leak (10/1000), retained common bile duct stone (10/1000), postcholecystectomy diarrhea (1-2%) and these complications may require additional treatment.    PLAN:   Plan for outpt lap cliff, PAC/Periop.   [place] due to potential for excessive inflammation.   Sincerely,     John Scott MD    Again, thank you for allowing me to participate in the care of your patient.      Sincerely,    Josue Mendez MD
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
## 2019-06-12 NOTE — LETTER
Tom Thurman  [address] [place] [address]      SURGERY PACKET            Your surgery is scheduled:    Date:   ________________________________    Time:   ________________________________    Please arrive at:    ______________________    Surgeon's Name: Dr. Villalobos  _______________________        Pre-Op Physical Fax Numbers:         [facility] Pre-Admissions  [facility] Fax:  [phone] / Phone:  [phone]        Your surgery is located at:      [address] [address] [phone]      [email or web address]       Before Your Surgery  For Patients and Visitors at [place]    Welcome  As you get ready for surgery, you may have a lot of questions.  This brochure will help you know what to expect before and after surgery.  You and your family are the most important members of your health care team.  You will need to take an active role in your care.    Be sure to ask questions and learn all that you can about your surgery.  If you have any safety concerns, we urge you to tell a nurse as soon as possible.   This brochure is for information only.  It does not replace the advice of your doctor.  Always follow your doctor's advice.    Please tell us if you need a .    GETTING READY FOR SURGERY  Always follow your surgeon's instructions.  If you don't, your surgery could be canceled.  Please use the following checklist.    Within 30 Days of Surgery:    Have a pre-surgery physical exam with your family doctor or partner.    If you use a [facility], all of your information from the pre-op physical will be in the [employer] computer system.    Ask the doctor to send all of your results to the hospital before the surgery.  The doctor also may ask you to bring the results with you on the day of surgery or you can fax them to [facility] Fax:  [phone] /  Phone:  [phone].  Tell the doctor if:    You are allergic to latex or rubber  (Latex and rubber gloves are often used in medical care).    You are taking any medicines (including aspirin), vitamins (Vitamin E, Fish Oil, Omegas) or herbal products.  You will need to stop taking some medicines before surgery.    You have any medical problems (allergies, diabetes or heart disease, for example).    You have a pacemaker or an AICD (automatic implanted cardiac defibrillator).  If you do, please bring the ID card with you on the day of surgery.    You are a smoker.  People who smoke have a higher risk of infection after surgery.  Ask your doctor how you can quit smoking.  Within 7 days of Surgery:    Pre-register with the hospital.  Please use the hospital's phone number, [phone]. Or, to register online, go to [email or web address].      Prior to your surgical procedure, a nurse will be contacting you to obtain a health history [facility] Fax:  [phone] / Phone:  [phone].  Additionally, someone from the Admissions Department will also contact you for preregistration ([phone]).      Call your insurance company.  Ask if you need pre-approval for your surgery.  If you do not have insurance, please let us know.      Arrange for someone to drive you home after surgery.  If you will have same-day surgery, you may not drive or take public transportation home by yourself.    Arrange for someone to stay with you for 24 hours after you go home.  This person must be a responsible adult (ie- Family member or friend).  The Day Before Surgery:     Call your surgeon if there are any changes in your health.  This includes signs of a cold or flu (such as a sore throat, runny nose, cough, rash or fever).    Do not smoke, drink alcohol or take over-the-counter medicine (unless your surgeon tells you to) for 24 hours before and after surgery.    If you take prescribed drugs:  You may need to stop them until after  the surgery.  Follow your doctor's orders.  You may resume Aspirin and/or blood thinners after your surgery as directed by your physician/surgeon.    NO FOOD OR DRINK AFTER MIDNIGHT.  Follow your surgeon's orders for eating and drinking.  You need to have an empty stomach before surgery.  This will make the surgery as safe as possible.  If you don't follow your doctor's orders, your surgery could be changed to another date.  A nurse will call you within a few days of surgery to go over these and other instructions.  If you do not hear from them, please call them at [facility] Fax:  [phone] / Phone:  [phone]  The Day of Surgery:    Take a shower or bath the night before and the morning of surgery.  Use antiseptic soap or the soap your surgeon gave you.  Gently clean the skin.  Do not shave or scrub your surgery site.    Please remove deodorant, cologne, scented lotion, makeup, nail polish and jewelry (including rings and body piercings).  If you wear artificial nails, please remove at least one nail before coming to the hospital.    Wear clean, loose clothing to the hospital.    Bring these items to the hospital:  1. Your insurance card.  2. Money for parking and co-pays (for medicines or the surgery), if needed.   3. A list of all the medicines you take.  Include vitamins, minerals, herbs and over-the-counter drugs.  Note any drug allergies.  4. A copy of your advance health care directive, if you have one.  This tells us what treatment you would want -- and who would make health care decisions -- if you could no longer speak for yourself.  You may request this form in advance or download it from [email or web address].  5. A case for any glasses, contact lenses, hearing aids or dentures.  6. Your inhaler or CPAP machine, if you use these at home.  Leave extra cash, jewelry and other valuables at home.    When You Arrive:  When you get to the hospital, you will:    Check in.  If you are under age 18,  you must be with a parent or legal guardian.    Sign consent forms, if you haven't already.  These forms state that you know the risks and benefits of surgery.  When you sign the forms, you give us permission to do the surgery.  Do not sign them unless you understand what will happen during and after your surgery.  If you have any questions about your surgery, ask to speak with your doctor before you sign the forms.  If you don't understand the answers, ask again.    Receive a copy of the Patients Bill of Rights.  If you do not receive a copy, please ask for one.    Change into hospital clothes.  Your belongings will be placed in a bag.  We will return them to you after surgery.    Meet with the anesthesia provider.  He or she will tell you what kind of anesthesia (medicine) will be used to keep you comfortable during surgery.  Remember: It's okay to remind doctors and nurses to wash their hands before touching you.   In most cases, your surgeon will use a marker to write his or her initials on the surgery site.  This ensures that the exact site is operated on.  For safety reasons, we will ask you the same questions many times.  For example, we may ask your name and birth date over and over again.  Friends and family can stay with you until it's time for surgery.  While you're in surgery, they will be in the waiting area.  Please note that cell phones are not allowed in some patient care areas.  If you have questions about what will happen in the operating room, talk to your care team.  After Surgery:    We will move you to a recovery room where we will watch you closely.  If you have any pain or discomfort, tell your nurse.  He or she will try to make you comfortable.      If you are staying overnight we will move you to your hospital room after you are awake.    If you are going home we will move you to another room.  Friends and family may be able to join you.  The length of time you spend in recovery depends on  "the type of medicine you received, your medical condition, and the type of surgery you had.  Dealing with pain:  A nurse will check your comfort level often during your stay.  He or she will work with you to manage your pain.  Remember:    All pain is real.  There are many ways to control pain.  We can help you decide what works best for you.    Ask for pain medicine when you need it.  Don't try to \"tough it out\" -- this can make you feel worse.  Always take your medicine as ordered.    Medicine doesn't work the same for everyone.  If your medicine isn't working tell your nurse.  There may be other medicines or treatments we can try.  Going Home:  We will let you know when you're ready to leave the hospital.  Before you leave, we will tell you how to care for yourself at home and prevent infections.  If you do not understand something, please say so.  We will answer any questions you have.  We will then help you get ready to leave.  You must have an adult with you for the first 24 hours after you leave the hospital. Take it easy when you get home.  You will need some time to recover -- you may be more tired than you realize at first.  Rest and relax for at least the first 24 hours at home.  You'll feel better and heal faster if you take good care of yourself.                      Pre-Operative Surgery Scrub    Purpose:   The skin harbors a large variety of bacteria, both infectious and noninfectious.  Showering with an antiseptic soap prior to an invasive procedure will decrease the number of transient and resident (good and bad) bacteria that is normally found on the skin.    Procedure:      Shower or bathe with 1/2 of the bottle of antiseptic soap (enclosed) the evening before and 1/2 of the bottle the morning of surgery (bathing the day of the procedure is most important).       Apply the soap at full strength (use the entire bottle).  Gently clean the skin, rinse, and dry with a clean towel that is freshly " laundered (out of the dryer) and then put on clean clothing that is freshly laundered.        We have given you information regarding pre-op showering.  We recommend that patients wash with an antiseptic soap prior to surgery.  This cleanser will be given to you at the clinic or mailed to your home.  It is advised that you liberally wash the specific area surgery area the night before, and again in the morning before the surgery.  Do not apply lotion afterward.  We would like to keep the skin as clean as possible.    Thank you for following these important instructions.      You have been scheduled for surgery and we would like to give you some information that will assist in helping get the best possible outcome.      Before Surgery:   If for any reason you decide not to have the surgery, please contact our office.  We can easily cancel or reschedule the procedure. Please call the  at [phone].      Any pain related to the surgery that occurs before the surgery needs to be reported and managed by your primary care or referring doctor.      Please keep in mind that the time of surgery is subject to change.  Make sure you have nothing to eat or drink after midnight.  If your surgery is later in the afternoon, this recommendation might change, but not until the day before surgery after the actual time of the surgery has been established.    After Surgery:  When you are discharged from the recovery room, the nurses will review instructions with you and your caregiver.      Please wash your hands every time you touch the wound or change bandages or dressings.      Do not submerge the wound in water.  You may not use a bathtub or hot tub until the wound is closed.  The wait time frame is generally 2-3 weeks but any open area can be a source of incoming bacteria, so it is better to be on the safe side and avoid the tub until your wound is fully healed.      You may take a shower 24 hours after surgery.   Double check with your surgeon if it is ok for water to run over a wound, whether it has been sutured, stapled, glued or is open.  You may gently wash the wound using the antiseptic soap provided for your pre-surgery showering (do not use a washcloth).  Any mild soap will work as well.      Many surgical wounds will have small white strips of tape on them called Steri Strips.  Do not remove these.  The edges will curl and fall off within 7-10 days with normal showering.      If you are going home with sutures (stitches) or staples, you must return to the clinic to have them taken out, usually within 1-2 weeks.      Signs and symptoms of infection include:  1. Fever, temperature over 101.5 ' F  2. Redness  3. Swelling  4. Increasing pain  5. Green or yellow drainage which may or may not have a foul odor.    Symptoms of infection need to be reported to your surgery office. Please call the nurse at [phone].   If you have had surgery with Dr. Villalobos or Dr. Mendez please call [phone] (option # 4).    If you or your  are deaf or hard of hearing, or prefer a language other than English, please let us know.  We have many free services, including interpreters and other aids to help you communicate. You may ask for help  through any member of your care team or by calling Language Services at [phone], option 2.
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
## 2019-06-12 NOTE — LETTER
Date:June 13, 2019      Patient was self referred, no letter generated. Do not send.        [facility] Health Information

## 2019-06-12 NOTE — DISCHARGE INSTRUCTIONS

## 2019-06-12 NOTE — PATIENT INSTRUCTIONS
Preparing for Your Surgery      Name:  Tom Thurman   MRN:  [id number]   :  1965   Today's Date:  2019     Arriving for surgery:  Surgery date:  19  Arrival time:  5:30 am    Please come to:     [facility] [address]  [address] [place]  [address]    - ? parking is available in front of the hospital      -    Please proceed to [address] on the 3rd floor. [phone]?     - ?If you are in need of directions, wheelchair or escort please stop at the Information Desk in the lobby.  Inform the information person that you are here for surgery; a wheelchair and escort to [address] will be provided.?     -  Bring your ID and insurance card.    What can I eat or drink?  -  You may have solid food or milk products until 8 hours prior to your surgery. (Until 11:30 pm 19 )  -  You may have water, apple juice or 7up/Sprite until 2 hours prior to your surgery. (Until 5:30 am )    Which medicines can I take?       -  Do not bring your own medications to the hospital.        -  Follow Surgery Clinic instructions regarding Ibuprofen. If no instructions given, NO Ibuprofen the day prior to surgery.         -  Hold Aspirin and Aspirin products 7 days prior to surgery.        -  Hold Naproxen (Aleve) 2 days prior to surgery.    -  Please take these medications the morning of surgery:  Simvastatin  Acetaminophen (Tylenol) if needed    How do I prepare myself?  -  Take two showers: one the night before surgery; and one the morning of surgery.         Use Scrubcare or Hibiclens to wash from neck down.  You may use your own shampoo and conditioner. No other hair products.   -  Do NOT use lotion, powder, colognes, deodorant, or antiperspirant the day of your surgery.  -  Do NOT wear any  jewelry.    Questions or Concerns:  If you have questions or concerns prior to your surgery, call [phone]. (Mon - Fri   8 am- 5:30 pm)  Questions about surgery, contact your  Surgeons office.      AFTER YOUR SURGERY  Breathing exercises   Breathing exercises help you recover faster. Take deep breaths and let the air out slowly. This will:     Help you wake up after surgery.    Help prevent complications like pneumonia.  Preventing complications will help you go home sooner.   We may give you a breathing device (incentive spirometer) to encourage you to breathe deeply.   Nausea and vomiting   You may feel sick to your stomach after surgery; if so, let your nurse know.    Pain control:  After surgery, you may have pain. Our goal is to help you manage your pain. Pain medicine will help you feel comfortable enough to do activities that will help you heal.  These activities may include breathing exercises, walking and physical therapy.   To help your health care team treat your pain we will ask: 1) If you have pain  2) where it is located 3) describe your pain in your words  Methods of pain control include medications given by mouth, vein or by nerve block for some surgeries.  Sequential Compression Device (SCD) or Pneumo Boots:  You may need to wear SCD S on your legs or feet. These are wraps connected to a machine that pumps in air and releases it. The repeated pumping helps prevent blood clots from forming.

## 2019-06-12 NOTE — PATIENT INSTRUCTIONS
Follow up:    Please call with any questions or concerns regarding your clinic visit today.    It is a pleasure being involved in your health care.    Contacts post-consultation depending on your need:    Schedule Clinic Appointments                 [phone] # 1   M-F 7:30 - 5 pm    Lorie Love RN Care Coordinator       [phone]  #3  Teresita Devi LPN                   [phone]  #3     OR Procedure Scheduling                       [phone]    My Chart is available 24 hours a day and is a secure way to access your records and communicate with your care team.  I strongly recommend signing up if you haven't already done so, if you are comfortable with computers.  If you would like to inquire about this or are having problems with My Chart access, you may call [phone] or go online at [email or web address].  Please allow at least 24 hours for a response and extra time on weekends and Holidays.

## 2019-06-12 NOTE — NURSING NOTE
"Chief Complaint   Patient presents with     RECHECK     pt here for follow up after endoscopic ultrasound and possible surgical referral. CT earlier today in Epic       Vitals:    06/12/19 0939   BP: 132/64   BP Location: Left arm   Patient Position: Chair   Cuff Size: Adult Regular   Pulse: 56   SpO2: 94%   Weight: 92.1 kg (203 lb)   Height: 1.778 m (5' 10\")       Body mass index is 29.13 kg/m .    Guille Retana, EMT    "

## 2019-06-12 NOTE — PROGRESS NOTES
"CC: pancreatitis follow-up    Subjective:     Patient presenting for follow-up for 2 episodes of acute pancreatitis is 8/2018.  These resolved with conservative management and he had a f/u EUS 1/2019 that showed cholelithiasis.  Repeat CT today as per our read showed (official pending) that the majority of the fluid collection has significantly decreased with stable appearing hepatic cysts.      He denies any interval episodes of pancreatitis, no fevers or chills and he has been able to have anormal diet and has gained the weight he lost with his pancreatitis episodes.  He has not had any changes to his stools, all normal 1x/day without black or white appearance.  He has not had anjaundice or eye yellowing noted by himself or family.    He does report minimal alcohol intake with no more than 1 drink on rare occassions with friends.    Lives in [place], independent with family.  Less than 7 drinks per week.    Past medical history:  -Acute pancreatitis with pancreatic necrosis  -Cholelithiasis    Family history:  No known    Allergies: NKDA    Current meds  Simvastatin 20 mg daily    /64 (BP Location: Left arm, Patient Position: Chair, Cuff Size: Adult Regular)   Pulse 56   Ht 1.778 m (5' 10\")   Wt 92.1 kg (203 lb)   SpO2 94%   BMI 29.13 kg/m       EXAM:  Constitutional: healthy, alert and no distress   Abdomen: Abdomen soft, non-tender. BS normal. No masses, organomegaly  CV: purposeful weight gaion, warm extremities, normal s1/2+ no MGR  Respiratory CTAB    A/P  Patient with h/o recurrent pancreatitis with no obvious family history who appears to be doing well.  Given CTand recent EUS findings first etiology on differential is biliary stone disease.  Given divisum seen on EUS this is sligtly less likely as a biliary stone should not be able to completely occlude the pancreatic duct to an extent that could cause distal pancreatitis.  Other etiologies include the divisum itself, although it is " controversial whether this could cause this extent of pancreatitis as well.  His EtOH intae should not predispose him to pancreatitis, Triglycerides are grossly normal and he is on a statin.  Next step will be a planned laparoscopic cholecystectomy and monitoring.  He is requesting this be done this month for insurance issues.  Plan:  -gen surgery consult for cholecystectomy  -no labs  -f/u if symptoms recur    Eliseo Pena   Resident Physician, PGY-2        Patient's care was discussed with Dr. Peacock    Seen and examined with GI fellow, agree with findings and recommendations.  25 minutes more than 50% counseling. Although appears to have pancreas divisum by EUS, and had body/tail pancreatitis, in the presence of documented gallstones, recommend laparoscopic cholecystectomy. Discussed w Dr Scott of [facility] surgery who has agreed to see him today.  Also discussed that if he has another episode of AP after cholecystectomy, which may well occur, then we discuss the ongoing randomized trial of minor papillotomy for pancreas divisum.  Javier Peacock MD GI Staff

## 2019-06-12 NOTE — TELEPHONE ENCOUNTER
FUTURE VISIT INFORMATION      SURGERY INFORMATION:  DOS , scheduled per Johnathon in surgery clinic. Okay per Ashleigh in PAC.  RECORDS REQUESTED FROM:       Primary Care Provider: John Figueroa MD- [facility]    Pertinent Medical History: Hypertension    Most recent EKG+ Tracin19

## 2019-06-14 ENCOUNTER — ANESTHESIA (OUTPATIENT)
Dept: SURGERY | Facility: CLINIC | Age: 54
End: 2019-06-14
Payer: COMMERCIAL

## 2019-06-14 ENCOUNTER — HOSPITAL ENCOUNTER (OUTPATIENT)
Facility: CLINIC | Age: 54
Discharge: HOME | End: 2019-06-14
Attending: SURGERY | Admitting: SURGERY
Payer: COMMERCIAL

## 2019-06-14 VITALS
TEMPERATURE: 97.9 F | HEIGHT: 70 IN | WEIGHT: 199.3 LBS | BODY MASS INDEX: 28.53 KG/M2 | OXYGEN SATURATION: 95 % | DIASTOLIC BLOOD PRESSURE: 66 MMHG | SYSTOLIC BLOOD PRESSURE: 109 MMHG | HEART RATE: 76 BPM | RESPIRATION RATE: 18 BRPM

## 2019-06-14 DIAGNOSIS — K85.10 GALLSTONE PANCREATITIS: Primary | ICD-10-CM

## 2019-06-14 LAB
ABO + RH BLD: NORMAL
ABO + RH BLD: NORMAL
BLD GP AB SCN SERPL QL: NORMAL
BLOOD BANK CMNT PATIENT-IMP: NORMAL
GLUCOSE BLDC GLUCOMTR-MCNC: 95 MG/DL (ref 70–99)
SPECIMEN EXP DATE BLD: NORMAL

## 2019-06-14 PROCEDURE — 25000128 H RX IP 250 OP 636: Performed by: ANESTHESIOLOGY

## 2019-06-14 PROCEDURE — 71000027 ZZH RECOVERY PHASE 2 EACH 15 MINS: Performed by: SURGERY

## 2019-06-14 PROCEDURE — 27210794 ZZH OR GENERAL SUPPLY STERILE: Performed by: SURGERY

## 2019-06-14 PROCEDURE — 25800030 ZZH RX IP 258 OP 636: Performed by: ANESTHESIOLOGY

## 2019-06-14 PROCEDURE — 36415 COLL VENOUS BLD VENIPUNCTURE: CPT | Performed by: ANESTHESIOLOGY

## 2019-06-14 PROCEDURE — 36000057 ZZH SURGERY LEVEL 3 1ST 30 MIN - UMMC: Performed by: SURGERY

## 2019-06-14 PROCEDURE — 86900 BLOOD TYPING SEROLOGIC ABO: CPT | Performed by: ANESTHESIOLOGY

## 2019-06-14 PROCEDURE — 37000008 ZZH ANESTHESIA TECHNICAL FEE, 1ST 30 MIN: Performed by: SURGERY

## 2019-06-14 PROCEDURE — 36000059 ZZH SURGERY LEVEL 3 EA 15 ADDTL MIN UMMC: Performed by: SURGERY

## 2019-06-14 PROCEDURE — 71000012 ZZH RECOVERY PHASE 1 LEVEL 1 FIRST HR: Performed by: SURGERY

## 2019-06-14 PROCEDURE — 82962 GLUCOSE BLOOD TEST: CPT

## 2019-06-14 PROCEDURE — 25000566 ZZH SEVOFLURANE, EA 15 MIN: Performed by: SURGERY

## 2019-06-14 PROCEDURE — 86850 RBC ANTIBODY SCREEN: CPT | Performed by: ANESTHESIOLOGY

## 2019-06-14 PROCEDURE — 25000132 ZZH RX MED GY IP 250 OP 250 PS 637: Performed by: ANESTHESIOLOGY

## 2019-06-14 PROCEDURE — 40000170 ZZH STATISTIC PRE-PROCEDURE ASSESSMENT II: Performed by: SURGERY

## 2019-06-14 PROCEDURE — 25000128 H RX IP 250 OP 636: Performed by: NURSE ANESTHETIST, CERTIFIED REGISTERED

## 2019-06-14 PROCEDURE — 37000009 ZZH ANESTHESIA TECHNICAL FEE, EACH ADDTL 15 MIN: Performed by: SURGERY

## 2019-06-14 PROCEDURE — 88304 TISSUE EXAM BY PATHOLOGIST: CPT | Performed by: SURGERY

## 2019-06-14 PROCEDURE — 25000125 ZZHC RX 250: Performed by: NURSE ANESTHETIST, CERTIFIED REGISTERED

## 2019-06-14 PROCEDURE — 25000128 H RX IP 250 OP 636: Performed by: SURGERY

## 2019-06-14 PROCEDURE — 25000128 H RX IP 250 OP 636: Performed by: PHYSICIAN ASSISTANT

## 2019-06-14 PROCEDURE — 86901 BLOOD TYPING SEROLOGIC RH(D): CPT | Performed by: ANESTHESIOLOGY

## 2019-06-14 PROCEDURE — 71000013 ZZH RECOVERY PHASE 1 LEVEL 1 EA ADDTL HR: Performed by: SURGERY

## 2019-06-14 RX ORDER — FENTANYL CITRATE 50 UG/ML
25-50 INJECTION, SOLUTION INTRAMUSCULAR; INTRAVENOUS EVERY 5 MIN PRN
Status: DISCONTINUED | OUTPATIENT
Start: 2019-06-14 | End: 2019-06-14 | Stop reason: HOSPADM

## 2019-06-14 RX ORDER — BUPIVACAINE HYDROCHLORIDE 2.5 MG/ML
INJECTION, SOLUTION INFILTRATION; PERINEURAL PRN
Status: DISCONTINUED | OUTPATIENT
Start: 2019-06-14 | End: 2019-06-14 | Stop reason: HOSPADM

## 2019-06-14 RX ORDER — NALOXONE HYDROCHLORIDE 0.4 MG/ML
.1-.4 INJECTION, SOLUTION INTRAMUSCULAR; INTRAVENOUS; SUBCUTANEOUS
Status: DISCONTINUED | OUTPATIENT
Start: 2019-06-14 | End: 2019-06-14 | Stop reason: HOSPADM

## 2019-06-14 RX ORDER — SODIUM CHLORIDE, SODIUM LACTATE, POTASSIUM CHLORIDE, CALCIUM CHLORIDE 600; 310; 30; 20 MG/100ML; MG/100ML; MG/100ML; MG/100ML
INJECTION, SOLUTION INTRAVENOUS CONTINUOUS
Status: DISCONTINUED | OUTPATIENT
Start: 2019-06-14 | End: 2019-06-14 | Stop reason: HOSPADM

## 2019-06-14 RX ORDER — HYDRALAZINE HYDROCHLORIDE 20 MG/ML
2.5-5 INJECTION INTRAMUSCULAR; INTRAVENOUS EVERY 10 MIN PRN
Status: DISCONTINUED | OUTPATIENT
Start: 2019-06-14 | End: 2019-06-14 | Stop reason: HOSPADM

## 2019-06-14 RX ORDER — GLYCOPYRROLATE 0.2 MG/ML
INJECTION, SOLUTION INTRAMUSCULAR; INTRAVENOUS PRN
Status: DISCONTINUED | OUTPATIENT
Start: 2019-06-14 | End: 2019-06-14

## 2019-06-14 RX ORDER — DEXAMETHASONE SODIUM PHOSPHATE 4 MG/ML
INJECTION, SOLUTION INTRA-ARTICULAR; INTRALESIONAL; INTRAMUSCULAR; INTRAVENOUS; SOFT TISSUE PRN
Status: DISCONTINUED | OUTPATIENT
Start: 2019-06-14 | End: 2019-06-14

## 2019-06-14 RX ORDER — LIDOCAINE 40 MG/G
CREAM TOPICAL
Status: DISCONTINUED | OUTPATIENT
Start: 2019-06-14 | End: 2019-06-14 | Stop reason: HOSPADM

## 2019-06-14 RX ORDER — PHYSOSTIGMINE SALICYLATE 1 MG/ML
1.2 INJECTION INTRAVENOUS
Status: DISCONTINUED | OUTPATIENT
Start: 2019-06-14 | End: 2019-06-14 | Stop reason: HOSPADM

## 2019-06-14 RX ORDER — LABETALOL 20 MG/4 ML (5 MG/ML) INTRAVENOUS SYRINGE
10
Status: DISCONTINUED | OUTPATIENT
Start: 2019-06-14 | End: 2019-06-14 | Stop reason: HOSPADM

## 2019-06-14 RX ORDER — ONDANSETRON 4 MG/1
4 TABLET, ORALLY DISINTEGRATING ORAL EVERY 30 MIN PRN
Status: DISCONTINUED | OUTPATIENT
Start: 2019-06-14 | End: 2019-06-14 | Stop reason: HOSPADM

## 2019-06-14 RX ORDER — AMOXICILLIN 250 MG
1 CAPSULE ORAL 2 TIMES DAILY PRN
Qty: 14 TABLET | Refills: 0 | Status: SHIPPED | OUTPATIENT
Start: 2019-06-14

## 2019-06-14 RX ORDER — ACETAMINOPHEN 325 MG/1
975 TABLET ORAL ONCE
Status: COMPLETED | OUTPATIENT
Start: 2019-06-14 | End: 2019-06-14

## 2019-06-14 RX ORDER — ONDANSETRON 2 MG/ML
INJECTION INTRAMUSCULAR; INTRAVENOUS PRN
Status: DISCONTINUED | OUTPATIENT
Start: 2019-06-14 | End: 2019-06-14

## 2019-06-14 RX ORDER — HYDROMORPHONE HYDROCHLORIDE 1 MG/ML
.3-.5 INJECTION, SOLUTION INTRAMUSCULAR; INTRAVENOUS; SUBCUTANEOUS EVERY 10 MIN PRN
Status: DISCONTINUED | OUTPATIENT
Start: 2019-06-14 | End: 2019-06-14 | Stop reason: HOSPADM

## 2019-06-14 RX ORDER — FENTANYL CITRATE 50 UG/ML
25-50 INJECTION, SOLUTION INTRAMUSCULAR; INTRAVENOUS
Status: DISCONTINUED | OUTPATIENT
Start: 2019-06-14 | End: 2019-06-14 | Stop reason: HOSPADM

## 2019-06-14 RX ORDER — OXYCODONE HYDROCHLORIDE 5 MG/1
5 TABLET ORAL EVERY 4 HOURS PRN
Qty: 12 TABLET | Refills: 0 | Status: SHIPPED | OUTPATIENT
Start: 2019-06-14

## 2019-06-14 RX ORDER — MEPERIDINE HYDROCHLORIDE 25 MG/ML
12.5 INJECTION INTRAMUSCULAR; INTRAVENOUS; SUBCUTANEOUS
Status: DISCONTINUED | OUTPATIENT
Start: 2019-06-14 | End: 2019-06-14 | Stop reason: HOSPADM

## 2019-06-14 RX ORDER — EPHEDRINE SULFATE 50 MG/ML
INJECTION, SOLUTION INTRAMUSCULAR; INTRAVENOUS; SUBCUTANEOUS PRN
Status: DISCONTINUED | OUTPATIENT
Start: 2019-06-14 | End: 2019-06-14

## 2019-06-14 RX ORDER — CEFAZOLIN SODIUM 1 G/3ML
1 INJECTION, POWDER, FOR SOLUTION INTRAMUSCULAR; INTRAVENOUS SEE ADMIN INSTRUCTIONS
Status: DISCONTINUED | OUTPATIENT
Start: 2019-06-14 | End: 2019-06-14 | Stop reason: HOSPADM

## 2019-06-14 RX ORDER — PROPOFOL 10 MG/ML
INJECTION, EMULSION INTRAVENOUS PRN
Status: DISCONTINUED | OUTPATIENT
Start: 2019-06-14 | End: 2019-06-14

## 2019-06-14 RX ORDER — FENTANYL CITRATE 50 UG/ML
INJECTION, SOLUTION INTRAMUSCULAR; INTRAVENOUS PRN
Status: DISCONTINUED | OUTPATIENT
Start: 2019-06-14 | End: 2019-06-14

## 2019-06-14 RX ORDER — CEFAZOLIN SODIUM 2 G/100ML
2 INJECTION, SOLUTION INTRAVENOUS
Status: COMPLETED | OUTPATIENT
Start: 2019-06-14 | End: 2019-06-14

## 2019-06-14 RX ORDER — ONDANSETRON 2 MG/ML
4 INJECTION INTRAMUSCULAR; INTRAVENOUS EVERY 30 MIN PRN
Status: DISCONTINUED | OUTPATIENT
Start: 2019-06-14 | End: 2019-06-14 | Stop reason: HOSPADM

## 2019-06-14 RX ORDER — GABAPENTIN 300 MG/1
300 CAPSULE ORAL ONCE
Status: COMPLETED | OUTPATIENT
Start: 2019-06-14 | End: 2019-06-14

## 2019-06-14 RX ADMIN — SUGAMMADEX 200 MG: 100 INJECTION, SOLUTION INTRAVENOUS at 09:06

## 2019-06-14 RX ADMIN — ONDANSETRON 4 MG: 2 INJECTION INTRAMUSCULAR; INTRAVENOUS at 08:51

## 2019-06-14 RX ADMIN — HYDROMORPHONE HYDROCHLORIDE 0.5 MG: 1 INJECTION, SOLUTION INTRAMUSCULAR; INTRAVENOUS; SUBCUTANEOUS at 10:00

## 2019-06-14 RX ADMIN — Medication: at 12:05

## 2019-06-14 RX ADMIN — HYDROMORPHONE HYDROCHLORIDE 0.5 MG: 1 INJECTION, SOLUTION INTRAMUSCULAR; INTRAVENOUS; SUBCUTANEOUS at 09:11

## 2019-06-14 RX ADMIN — GLYCOPYRROLATE 0.2 MG: 0.2 INJECTION, SOLUTION INTRAMUSCULAR; INTRAVENOUS at 08:11

## 2019-06-14 RX ADMIN — PROCHLORPERAZINE EDISYLATE 10 MG: 5 INJECTION INTRAMUSCULAR; INTRAVENOUS at 12:11

## 2019-06-14 RX ADMIN — HYDROMORPHONE HYDROCHLORIDE 0.5 MG: 1 INJECTION, SOLUTION INTRAMUSCULAR; INTRAVENOUS; SUBCUTANEOUS at 10:49

## 2019-06-14 RX ADMIN — SODIUM CHLORIDE, POTASSIUM CHLORIDE, SODIUM LACTATE AND CALCIUM CHLORIDE: 600; 310; 30; 20 INJECTION, SOLUTION INTRAVENOUS at 07:20

## 2019-06-14 RX ADMIN — ONDANSETRON 4 MG: 2 INJECTION INTRAMUSCULAR; INTRAVENOUS at 12:01

## 2019-06-14 RX ADMIN — MIDAZOLAM 2 MG: 1 INJECTION INTRAMUSCULAR; INTRAVENOUS at 07:20

## 2019-06-14 RX ADMIN — ACETAMINOPHEN 975 MG: 325 TABLET, FILM COATED ORAL at 06:21

## 2019-06-14 RX ADMIN — FENTANYL CITRATE 50 MCG: 50 INJECTION, SOLUTION INTRAMUSCULAR; INTRAVENOUS at 09:08

## 2019-06-14 RX ADMIN — CEFAZOLIN SODIUM 2 G: 2 INJECTION, SOLUTION INTRAVENOUS at 07:50

## 2019-06-14 RX ADMIN — FENTANYL CITRATE 150 MCG: 50 INJECTION, SOLUTION INTRAMUSCULAR; INTRAVENOUS at 07:38

## 2019-06-14 RX ADMIN — Medication 15 MG: at 08:09

## 2019-06-14 RX ADMIN — PROPOFOL 25 MG: 10 INJECTION, EMULSION INTRAVENOUS at 07:40

## 2019-06-14 RX ADMIN — ROCURONIUM BROMIDE 20 MG: 10 INJECTION INTRAVENOUS at 08:30

## 2019-06-14 RX ADMIN — HYDROMORPHONE HYDROCHLORIDE 0.5 MG: 1 INJECTION, SOLUTION INTRAMUSCULAR; INTRAVENOUS; SUBCUTANEOUS at 08:49

## 2019-06-14 RX ADMIN — GABAPENTIN 300 MG: 300 CAPSULE ORAL at 06:21

## 2019-06-14 RX ADMIN — SODIUM CHLORIDE, POTASSIUM CHLORIDE, SODIUM LACTATE AND CALCIUM CHLORIDE: 600; 310; 30; 20 INJECTION, SOLUTION INTRAVENOUS at 09:15

## 2019-06-14 RX ADMIN — PROPOFOL 25 MG: 10 INJECTION, EMULSION INTRAVENOUS at 08:30

## 2019-06-14 RX ADMIN — PROPOFOL 150 MG: 10 INJECTION, EMULSION INTRAVENOUS at 07:39

## 2019-06-14 RX ADMIN — ROCURONIUM BROMIDE 50 MG: 10 INJECTION INTRAVENOUS at 07:40

## 2019-06-14 RX ADMIN — DEXAMETHASONE SODIUM PHOSPHATE 8 MG: 4 INJECTION, SOLUTION INTRA-ARTICULAR; INTRALESIONAL; INTRAMUSCULAR; INTRAVENOUS; SOFT TISSUE at 08:06

## 2019-06-14 RX ADMIN — FENTANYL CITRATE 50 MCG: 50 INJECTION, SOLUTION INTRAMUSCULAR; INTRAVENOUS at 07:29

## 2019-06-14 ASSESSMENT — ENCOUNTER SYMPTOMS
DYSRHYTHMIAS: 0
SEIZURES: 0

## 2019-06-14 ASSESSMENT — LIFESTYLE VARIABLES: TOBACCO_USE: 0

## 2019-06-14 ASSESSMENT — MIFFLIN-ST. JEOR: SCORE: 1760.25

## 2019-06-14 NOTE — OP NOTE
[facility], [place]    Operative Note    Pre-operative diagnosis: Gallstone Pancreatitis   Post-operative diagnosis same   Procedure: Procedure(s):  Laparoscopic Cholecystectomy   Surgeon: John Scott MD   Assistant:      Resident    Anesthesia: Herber Domingo MD  General    Estimated blood loss: Minimal   Drains: None   Specimens: gallbladder   Findings: mildly inflamed gallbladder.  ]   Complications: None.   Implants: None.       OPERATIVE INDICATIONS:  Tom Thurman is a 53 year old-year-old male with a history of biliary pancreatitis. After understanding the risks and benefits of proceeding with surgery, the patient has an indication for laparoscopic cholecystectomy and consented to undergo surgery.    I reviewed the risks of surgery with Tom Thurman.    These include, but are not limited to, death, myocardial infarction, pneumonia, urinary tract infection, deep venous thrombosis with or without pulmonary embolus, abdominal infection from bowel injury or abscess, bowel obstruction, wound infection, and bleeding.    More specific risks related to laparoscopic cholecystectomy include bile duct injury (3/1000), bile leak (10/1000), retained common bile duct stone (10/1000), postcholecystectomy diarrhea (1-2%) and these complications may require additional treatment.    OPERATIVE DETAILS:      The patient was brought to the operating room and prepared in a routine fashion.  A timeout was performed prior to surgery and documented by the nursing team.     Under the benefits of general anesthesia, a left upper quadrant Veress needle was inserted and pneumoperitoneum was established using carbon dioxide gas to a maximum pressure of 15 mmHg.  A total of 4 ports were placed and the laparoscope was utilized from the umbilical port.     The patient was moved into a steep reverse Trendelenberg position.    Several adhesions to the gallbladder were taken down with a combination of blunt and  sharp dissection.     The gallbladder was grasped at its fundus and retracted cephalad.  It was also grasped at its infundibulum and retracted laterally.       Findings related to the gallbladder are as noted above.     A complete dissection starting on the gallbladder, identifying the cystic artery on the surface of the gallbladder, was performed.  The cystic artery in this manner was  away from the gallbladder and the infundibulum of the gallbladder and the junction of gallbladder and cystic duct was clearly and completely identified with blunt dissection.  All of this dissection was performed on the gallbladder wall and clearly above the triangle of Calot.     Next, a dissection of gallbladder retrograde from fundus and the peritoneum was divided along the lateral aspect of the gallbladder. Next, a complete dissection of the upper aspect of the triangle of Calot was performed and the critical view of safety was achieved.     The gallbladder was grasped at its fundus and retracted cephalad.  It was also grasped at its infundibulum and retracted medially.      A complete dissection starting on the gallbladder, identifying the cystic artery on the surface of the gallbladder, was performed.  The cystic artery in this manner was  away from the gallbladder and the infundibulum of the gallbladder and the junction of gallbladder and cystic duct were clearly and completely identified with blunt dissection.  All of this dissection was performed on the gallbladder wall and clearly above the triangle of Calot.    Next, a complete dissection of the upper aspect of the triangle of Calot was performed. The cystic duct and artery were skeletonized and the critical view of safety was achieved.    A gallbladder injury was inadvertently made, thus, suction irrigation was employed to evacuate bile and irrigate.    The cystic artery and cystic duct were clipped securely x3 and divided between clips. The gallbladder  was then removed out of its fossa using electrocautery.  It was placed into an Endocatch bag and removed from the abdomen.     Next, the gallbladder fossa was irrigated with a small aliquot of saline and the saline was aspirated.     Complete hemostasis was achieved.     The umbilical incision was closed using a single 0 Vicryl suture on PMI suture passer     25 mL of local anesthetic (0.5% bupivicaine diluted to 1/4 concentration) anesthetic block was injected around each of the port sites.    The skin was closed using 4-0 monocryl suture and Dermabond was applied.     I  was present for all critical components of the operation and all needle and sponge counts were correct x2 at the end of the procedure.    John Scott    Surgery  [phone] (hospital )  [phone] (clinic nurses)    Operation originally scheduled under my name; however, Dr. Scott performed all portions of the operation.

## 2019-06-14 NOTE — ANESTHESIA POSTPROCEDURE EVALUATION
Anesthesia POST Procedure Evaluation    Patient: Tom Tuhrman   MRN:     [id number] Gender:   male   Age:    53 year old :      1965        Preoperative Diagnosis: Gallstone Pancreatitis   Procedure(s):  Laparoscopic Cholecystectomy   Postop Comments: No value filed.       Anesthesia Type:  General  No value filed.    Reportable Event: NO     PAIN:        Disposition:        Comments/Narrative:  Evaluation location: PACU    Mental status: Preoperative baseline. Awake and oriented, responding to questions appropriately  Airway: Natural airway (no supplemental O2)  Pain: Well controlled/None  Nausea: None  Vitals: Stable    Disposition: Appropriate to be discharged to home    ### Note may not be fully accurate (as some portions are automatically generated upon opening) ###             Last Anesthesia Record Vitals:  CRNA VITALS  2019 0848 - 2019 0948      2019             NIBP:  153/93  (Abnormal)     NIBP Mean:  112    Ht Rate:  68    SpO2:  98 %          Last PACU Vitals:  Vitals Value Taken Time   /76 2019 11:38 AM   Temp 36.8  C (98.2  F) 2019 11:38 AM   Pulse 71 2019 11:38 AM   Resp 16 2019 11:38 AM   SpO2 97 % 2019 11:37 AM   Temp src     NIBP     Pulse     SpO2     Resp     Temp     Ht Rate     Temp 2     Vitals shown include unvalidated device data.      Electronically Signed By: Herber Hemphill MD, 2019, 1:04 PM

## 2019-06-14 NOTE — PROGRESS NOTES
SPIRITUAL HEALTH SERVICES  [facility] ([place] 3C   PRE-SURGERY VISIT    Had pre-surgery visit with pt. Provided spiritual support, prayer.     Kevin Ghosh M.Div.     Pager [phone]

## 2019-06-14 NOTE — DISCHARGE INSTRUCTIONS
[facility], [place]  Take it easy when you get home.  Remember, same day surgery DOES NOT MEAN SAME DAY RECOVERY!  Healing is a gradual process.  You will need some time to recover - you may be more tired than you realize at first.  Rest and relax for at least the first 24 hours at home.  You'll feel better and heal faster if you take good care of yourself.  Same-Day Surgery   Adult Discharge Orders & Instructions     For 24 hours after surgery    1. Get plenty of rest.  A responsible adult must stay with you for at least 24 hours after you leave the hospital.   2. Do not drive or use heavy equipment.  If you have weakness or tingling, don't drive or use heavy equipment until this feeling goes away.  3. Do not drink alcohol.  4. Avoid strenuous or risky activities.  Ask for help when climbing stairs.   5. You may feel lightheaded.  IF so, sit for a few minutes before standing.  Have someone help you get up.   6. If you have nausea (feel sick to your stomach): Drink only clear liquids such as apple juice, ginger ale, broth or 7-Up.  Rest may also help.  Be sure to drink enough fluids.  Move to a regular diet as you feel able.  7. You may have a slight fever. Call the doctor if your fever is over 100 F (37.7 C) (taken under the tongue) or lasts longer than 24 hours.  8. You may have a dry mouth, a sore throat, muscle aches or trouble sleeping.  These should go away after 24 hours.  9. Do not make important or legal decisions.   Call your doctor for any of the followin.  Signs of infection (fever, growing tenderness at the surgery site, a large amount of drainage or bleeding, severe pain, foul-smelling drainage, redness, swelling).    2. It has been over 8 to 10 hours since surgery and you are still not able to urinate (pass water).    3.  Headache for over 24 hours.    4.  Numbness, tingling or weakness the day after surgery (if you had spinal anesthesia).  To contact a doctor, call  Dr Villalobos's office at [phone] or: Call [phone] ask for the GENERAL SURGERY RESIDENT on call (answered 24 hours a day)      Emergency Department: [facility]: [phone]

## 2019-06-14 NOTE — ANESTHESIA CARE TRANSFER NOTE
Patient: Tom Thurman    Procedure(s):  Laparoscopic Cholecystectomy    Diagnosis: Gallstone Pancreatitis  Diagnosis Additional Information: No value filed.    Anesthesia Type:   No value filed.     Note:  Airway :Face Mask  Patient transferred to:PACU  Comments: Pt alert, breathing spontaneously on 6L O2 via FM. VSS. Report shared with RN.Handoff Report: Identifed the Patient, Identified the Reponsible Provider, Reviewed the pertinent medical history, Discussed the surgical course, Reviewed Intra-OP anesthesia mangement and issues during anesthesia, Set expectations for post-procedure period and Allowed opportunity for questions and acknowledgement of understanding      Vitals: (Last set prior to Anesthesia Care Transfer)    CRNA VITALS  6/14/2019 0848 - 6/14/2019 0920      6/14/2019             Resp Rate (observed):  4  (Abnormal)                 Electronically Signed By: SERVANDO Thomson CRNA  June 14, 2019  9:20 AM

## 2019-06-17 ENCOUNTER — TELEPHONE (OUTPATIENT)
Dept: SURGERY | Facility: CLINIC | Age: 54
End: 2019-06-17

## 2019-06-17 LAB — COPATH REPORT: NORMAL

## 2019-06-17 NOTE — TELEPHONE ENCOUNTER
Bariatric Surgery Post op Call      48-72 Hour Post-Op Follow Up:     Mr. Tom Thurman is a 53 year old male who underwent Laparoscopic Cholecystectomy on 06/14/2019 with Dr. Villalobos for a history of gall stones.  Spoke with Patient.    Support  Patient able to care for self independently    Coughing/Deep Breathing:yes  Pain rating: 3/10    (If patient needs additional pain medication and Tylenol is not contraindicated, then ok  advise patient to take ES Tylenol 2 tablets every 6 hours while symptoms last, not to exceed 6 caplets in 24 hours).    Leg swelling: no  Dizzy/lightheaded?: none  Nausea/Vomiting: denies  Passing flatus: yes  Having BM s: yes  Activity Level: slowly increasing. Went back to work today and made it 4 hours.    Do you have any questions about medications that you were discharged on from the hospital?  None at this time      Activity/Restrictions  Increasing slowly    Equipment  other none    Activity/Restrictions  Patient following lifting restrictions. and Following restrictions outlined by surgeon.     Whom and When to Call  Patient acknowledges understanding of how to manage any medication changes and when to seek medical care.      Patient advised that if after hour medical concerns arise to please call [phone] and choose option 4 to speak to the physician on call.      How was your hospital stay positive experience       Time spent with patient: 20 minutes

## 2019-11-13 NOTE — ANESTHESIA CARE TRANSFER NOTE
Patient: Tom Thurman    Procedure(s):  COMBINED ENDOSCOPIC ULTRASOUND, ESOPHAGOSCOPY, GASTROSCOPY, DUODENOSCOPY (EGD)    Diagnosis: Acute pancreatitis [K85.90]; EUS w/MAC- prep packet mailed  Diagnosis Additional Information: No value filed.    Anesthesia Type:   No value filed.     Note:    Patient transferred to:Phase II  Comments: Anesthesia Care Transfer Note    Patient: Tom Thurman    Transferred to: ENDO    Patient vital signs: stable    Airway: none    Monitors on, breathing spontaneously, report to TED Mack CRNA  4/16/2019 11:30 AM    Handoff Report: Identifed the Patient, Identified the Reponsible Provider, Reviewed the pertinent medical history, Discussed the surgical course, Reviewed Intra-OP anesthesia mangement and issues during anesthesia, Set expectations for post-procedure period and Allowed opportunity for questions and acknowledgement of understanding      Vitals: (Last set prior to Anesthesia Care Transfer)    CRNA VITALS  4/16/2019 1056 - 4/16/2019 1130      4/16/2019             Pulse:  75    Ht Rate:  73    SpO2:  95 %                Electronically Signed By: SERVANDO Mcmillan CRNA  April 16, 2019  11:30 AM  
Pt to meet > 80% of estimated nutrition needs

## 2020-03-11 ENCOUNTER — HEALTH MAINTENANCE LETTER (OUTPATIENT)
Age: 55
End: 2020-03-11

## 2021-01-03 ENCOUNTER — HEALTH MAINTENANCE LETTER (OUTPATIENT)
Age: 56
End: 2021-01-03

## 2021-04-25 ENCOUNTER — HEALTH MAINTENANCE LETTER (OUTPATIENT)
Age: 56
End: 2021-04-25

## 2021-10-10 ENCOUNTER — HEALTH MAINTENANCE LETTER (OUTPATIENT)
Age: 56
End: 2021-10-10

## 2022-05-21 ENCOUNTER — HEALTH MAINTENANCE LETTER (OUTPATIENT)
Age: 57
End: 2022-05-21

## 2022-09-18 ENCOUNTER — HEALTH MAINTENANCE LETTER (OUTPATIENT)
Age: 57
End: 2022-09-18

## (undated) DEVICE — ENDO TROCAR FIRST ENTRY KII FIOS Z-THRD 12X100MM CTF73

## (undated) DEVICE — ENDO TROCAR SLEEVE KII Z-THREADED 05X100MM CTS02

## (undated) DEVICE — DECANTER VIAL 2006S

## (undated) DEVICE — DEVICE SUTURE PASSER 14GA WECK EFX EFXSP2

## (undated) DEVICE — CLIP APPLIER ENDO 5MM M/L LIGAMAX EL5ML

## (undated) DEVICE — NDL ANGIOCATH 22GA 1" 4050

## (undated) DEVICE — NDL INSUFFLATION 13GA 150MM C2202

## (undated) DEVICE — ENDO TROCAR FIRST ENTRY KII FIOS Z-THRD 05X100MM CTF03

## (undated) DEVICE — Device

## (undated) DEVICE — SOL WATER IRRIG 1000ML BOTTLE 2F7114

## (undated) DEVICE — PREP CHLORAPREP 26ML TINTED ORANGE  260815

## (undated) DEVICE — SUCTION IRR STRYKERFLOW II W/TIP 250-070-520

## (undated) DEVICE — ANTIFOG SOLUTION W/FOAM PAD 31142527

## (undated) DEVICE — SOL NACL 0.9% INJ 1000ML BAG 07983-09

## (undated) DEVICE — GLOVE PROTEXIS POWDER FREE SMT 7.5  2D72PT75X

## (undated) DEVICE — SUCTION MANIFOLD DORNOCH ULTRA CART UL-CL500

## (undated) DEVICE — ENDO SCOPE WARMER SEAL  C3101

## (undated) DEVICE — SU VICRYL 0 UR-6 27" J603H

## (undated) DEVICE — LINEN TOWEL PACK X30 5481

## (undated) DEVICE — ENDO POUCH UNIV RETRIEVAL SYSTEM INZII 10MM CD001

## (undated) DEVICE — ESU ENDO SCISSORS 5MM CVD 5DCS

## (undated) DEVICE — BLADE CLIPPER SGL USE 9680

## (undated) DEVICE — ENDO DISSECTOR BLUNT 05MM  BTD05

## (undated) DEVICE — SYR 30ML LL W/O NDL 302832

## (undated) DEVICE — LINEN TOWEL PACK X6 WHITE 5487

## (undated) DEVICE — ESU GROUND PAD ADULT W/CORD E7507

## (undated) RX ORDER — HYDROMORPHONE HYDROCHLORIDE 1 MG/ML
INJECTION, SOLUTION INTRAMUSCULAR; INTRAVENOUS; SUBCUTANEOUS
Status: DISPENSED
Start: 2019-06-14

## (undated) RX ORDER — FENTANYL CITRATE 50 UG/ML
INJECTION, SOLUTION INTRAMUSCULAR; INTRAVENOUS
Status: DISPENSED
Start: 2019-04-16

## (undated) RX ORDER — OXYCODONE HYDROCHLORIDE 5 MG/1
TABLET ORAL
Status: DISPENSED
Start: 2019-06-14

## (undated) RX ORDER — GABAPENTIN 300 MG/1
CAPSULE ORAL
Status: DISPENSED
Start: 2019-06-14

## (undated) RX ORDER — PROPOFOL 10 MG/ML
INJECTION, EMULSION INTRAVENOUS
Status: DISPENSED
Start: 2019-06-14

## (undated) RX ORDER — ONDANSETRON 2 MG/ML
INJECTION INTRAMUSCULAR; INTRAVENOUS
Status: DISPENSED
Start: 2019-06-14

## (undated) RX ORDER — CEFAZOLIN SODIUM 2 G/100ML
INJECTION, SOLUTION INTRAVENOUS
Status: DISPENSED
Start: 2019-06-14

## (undated) RX ORDER — LIDOCAINE HYDROCHLORIDE 20 MG/ML
INJECTION, SOLUTION EPIDURAL; INFILTRATION; INTRACAUDAL; PERINEURAL
Status: DISPENSED
Start: 2019-06-14

## (undated) RX ORDER — BUPIVACAINE HYDROCHLORIDE 2.5 MG/ML
INJECTION, SOLUTION EPIDURAL; INFILTRATION; INTRACAUDAL
Status: DISPENSED
Start: 2019-06-14

## (undated) RX ORDER — ACETAMINOPHEN 325 MG/1
TABLET ORAL
Status: DISPENSED
Start: 2019-06-14

## (undated) RX ORDER — LIDOCAINE HYDROCHLORIDE AND EPINEPHRINE 10; 10 MG/ML; UG/ML
INJECTION, SOLUTION INFILTRATION; PERINEURAL
Status: DISPENSED
Start: 2019-06-14

## (undated) RX ORDER — FENTANYL CITRATE 50 UG/ML
INJECTION, SOLUTION INTRAMUSCULAR; INTRAVENOUS
Status: DISPENSED
Start: 2019-06-14

## (undated) RX ORDER — DEXAMETHASONE SODIUM PHOSPHATE 4 MG/ML
INJECTION, SOLUTION INTRA-ARTICULAR; INTRALESIONAL; INTRAMUSCULAR; INTRAVENOUS; SOFT TISSUE
Status: DISPENSED
Start: 2019-06-14

## (undated) RX ORDER — PROPOFOL 10 MG/ML
INJECTION, EMULSION INTRAVENOUS
Status: DISPENSED
Start: 2019-04-16